# Patient Record
Sex: MALE | Race: WHITE | NOT HISPANIC OR LATINO | Employment: OTHER | URBAN - METROPOLITAN AREA
[De-identification: names, ages, dates, MRNs, and addresses within clinical notes are randomized per-mention and may not be internally consistent; named-entity substitution may affect disease eponyms.]

---

## 2018-02-27 ENCOUNTER — HOSPITAL ENCOUNTER (OUTPATIENT)
Facility: MEDICAL CENTER | Age: 63
End: 2018-02-27
Attending: UROLOGY
Payer: COMMERCIAL

## 2018-02-27 LAB
APPEARANCE UR: ABNORMAL
BILIRUB UR QL STRIP.AUTO: NEGATIVE
COLOR UR: YELLOW
GLUCOSE UR STRIP.AUTO-MCNC: NEGATIVE MG/DL
KETONES UR STRIP.AUTO-MCNC: NEGATIVE MG/DL
LEUKOCYTE ESTERASE UR QL STRIP.AUTO: ABNORMAL
MICRO URNS: ABNORMAL
NITRITE UR QL STRIP.AUTO: POSITIVE
PH UR STRIP.AUTO: 5 [PH]
PROT UR QL STRIP: NEGATIVE MG/DL
RBC UR QL AUTO: ABNORMAL
SP GR UR STRIP.AUTO: 1.02
UROBILINOGEN UR STRIP.AUTO-MCNC: 0.2 MG/DL

## 2018-02-27 PROCEDURE — 87086 URINE CULTURE/COLONY COUNT: CPT

## 2018-02-27 PROCEDURE — 81001 URINALYSIS AUTO W/SCOPE: CPT

## 2018-02-27 PROCEDURE — 87186 SC STD MICRODIL/AGAR DIL: CPT

## 2018-02-27 PROCEDURE — 87077 CULTURE AEROBIC IDENTIFY: CPT

## 2018-02-28 LAB
BACTERIA #/AREA URNS HPF: ABNORMAL /HPF
CAOX CRY #/AREA URNS HPF: ABNORMAL /HPF
EPI CELLS #/AREA URNS HPF: NEGATIVE /HPF
HYALINE CASTS #/AREA URNS LPF: ABNORMAL /LPF
RBC # URNS HPF: ABNORMAL /HPF
WBC #/AREA URNS HPF: ABNORMAL /HPF

## 2018-03-02 LAB
BACTERIA UR CULT: ABNORMAL
BACTERIA UR CULT: ABNORMAL
SIGNIFICANT IND 70042: ABNORMAL
SITE SITE: ABNORMAL
SOURCE SOURCE: ABNORMAL

## 2018-04-19 ENCOUNTER — HOSPITAL ENCOUNTER (OUTPATIENT)
Dept: LAB | Facility: MEDICAL CENTER | Age: 63
End: 2018-04-19
Attending: UROLOGY
Payer: COMMERCIAL

## 2018-04-19 LAB
APPEARANCE UR: ABNORMAL
BACTERIA #/AREA URNS HPF: ABNORMAL /HPF
BILIRUB UR QL STRIP.AUTO: NEGATIVE
COLOR UR: YELLOW
EPI CELLS #/AREA URNS HPF: NEGATIVE /HPF
GLUCOSE UR STRIP.AUTO-MCNC: NEGATIVE MG/DL
HYALINE CASTS #/AREA URNS LPF: ABNORMAL /LPF
KETONES UR STRIP.AUTO-MCNC: NEGATIVE MG/DL
LEUKOCYTE ESTERASE UR QL STRIP.AUTO: ABNORMAL
MICRO URNS: ABNORMAL
NITRITE UR QL STRIP.AUTO: POSITIVE
PH UR STRIP.AUTO: 7.5 [PH]
PROT UR QL STRIP: NEGATIVE MG/DL
RBC # URNS HPF: ABNORMAL /HPF
RBC UR QL AUTO: NEGATIVE
SP GR UR STRIP.AUTO: 1.01
UROBILINOGEN UR STRIP.AUTO-MCNC: 1 MG/DL
WBC #/AREA URNS HPF: ABNORMAL /HPF

## 2018-04-19 PROCEDURE — 87086 URINE CULTURE/COLONY COUNT: CPT

## 2018-04-19 PROCEDURE — 81001 URINALYSIS AUTO W/SCOPE: CPT

## 2018-04-19 PROCEDURE — 87186 SC STD MICRODIL/AGAR DIL: CPT

## 2018-04-19 PROCEDURE — 87077 CULTURE AEROBIC IDENTIFY: CPT

## 2018-05-08 ENCOUNTER — HOSPITAL ENCOUNTER (OUTPATIENT)
Dept: LAB | Facility: MEDICAL CENTER | Age: 63
End: 2018-05-08
Attending: UROLOGY
Payer: COMMERCIAL

## 2018-05-08 PROCEDURE — 82565 ASSAY OF CREATININE: CPT

## 2018-05-08 PROCEDURE — 84520 ASSAY OF UREA NITROGEN: CPT

## 2018-05-08 PROCEDURE — 36415 COLL VENOUS BLD VENIPUNCTURE: CPT

## 2018-05-09 ENCOUNTER — HOSPITAL ENCOUNTER (OUTPATIENT)
Dept: RADIOLOGY | Facility: MEDICAL CENTER | Age: 63
End: 2018-05-09
Attending: UROLOGY
Payer: COMMERCIAL

## 2018-05-09 DIAGNOSIS — N20.9: ICD-10-CM

## 2018-05-09 DIAGNOSIS — E79.89: ICD-10-CM

## 2018-05-09 LAB
BUN SERPL-MCNC: 15 MG/DL (ref 8–22)
CREAT SERPL-MCNC: 0.97 MG/DL (ref 0.5–1.4)

## 2018-05-09 PROCEDURE — 74178 CT ABD&PLV WO CNTR FLWD CNTR: CPT

## 2018-05-09 PROCEDURE — 700117 HCHG RX CONTRAST REV CODE 255: Performed by: UROLOGY

## 2018-05-09 RX ADMIN — IOHEXOL 100 ML: 350 INJECTION, SOLUTION INTRAVENOUS at 15:25

## 2018-05-10 ENCOUNTER — HOSPITAL ENCOUNTER (OUTPATIENT)
Dept: LAB | Facility: MEDICAL CENTER | Age: 63
End: 2018-05-10
Attending: UROLOGY
Payer: COMMERCIAL

## 2018-05-10 PROCEDURE — 81001 URINALYSIS AUTO W/SCOPE: CPT

## 2018-05-10 PROCEDURE — 87086 URINE CULTURE/COLONY COUNT: CPT

## 2018-05-10 PROCEDURE — 87077 CULTURE AEROBIC IDENTIFY: CPT

## 2018-05-10 PROCEDURE — 87186 SC STD MICRODIL/AGAR DIL: CPT

## 2018-05-11 LAB
APPEARANCE UR: ABNORMAL
BACTERIA #/AREA URNS HPF: NEGATIVE /HPF
BILIRUB UR QL STRIP.AUTO: NEGATIVE
CAOX CRY #/AREA URNS HPF: ABNORMAL /HPF
COLOR UR: YELLOW
EPI CELLS #/AREA URNS HPF: NEGATIVE /HPF
GLUCOSE UR STRIP.AUTO-MCNC: NEGATIVE MG/DL
HYALINE CASTS #/AREA URNS LPF: ABNORMAL /LPF
KETONES UR STRIP.AUTO-MCNC: NEGATIVE MG/DL
LEUKOCYTE ESTERASE UR QL STRIP.AUTO: ABNORMAL
MICRO URNS: ABNORMAL
NITRITE UR QL STRIP.AUTO: POSITIVE
PH UR STRIP.AUTO: 6 [PH]
PROT UR QL STRIP: NEGATIVE MG/DL
RBC # URNS HPF: ABNORMAL /HPF
RBC UR QL AUTO: ABNORMAL
SP GR UR STRIP.AUTO: 1.03
UROBILINOGEN UR STRIP.AUTO-MCNC: 0.2 MG/DL
WBC #/AREA URNS HPF: ABNORMAL /HPF

## 2018-05-25 DIAGNOSIS — Z01.812 PRE-PROCEDURAL LABORATORY EXAMINATION: ICD-10-CM

## 2018-05-25 DIAGNOSIS — Z01.810 PRE-OPERATIVE CARDIOVASCULAR EXAMINATION: ICD-10-CM

## 2018-05-25 LAB
ANION GAP SERPL CALC-SCNC: 8 MMOL/L (ref 0–11.9)
APPEARANCE UR: ABNORMAL
BACTERIA #/AREA URNS HPF: ABNORMAL /HPF
BILIRUB UR QL STRIP.AUTO: NEGATIVE
BUN SERPL-MCNC: 16 MG/DL (ref 8–22)
CALCIUM SERPL-MCNC: 9.7 MG/DL (ref 8.5–10.5)
CHLORIDE SERPL-SCNC: 104 MMOL/L (ref 96–112)
CO2 SERPL-SCNC: 26 MMOL/L (ref 20–33)
COLOR UR: YELLOW
CREAT SERPL-MCNC: 0.95 MG/DL (ref 0.5–1.4)
EKG IMPRESSION: NORMAL
EPI CELLS #/AREA URNS HPF: NEGATIVE /HPF
ERYTHROCYTE [DISTWIDTH] IN BLOOD BY AUTOMATED COUNT: 45.8 FL (ref 35.9–50)
GLUCOSE SERPL-MCNC: 103 MG/DL (ref 65–99)
GLUCOSE UR STRIP.AUTO-MCNC: NEGATIVE MG/DL
HCT VFR BLD AUTO: 48.4 % (ref 42–52)
HGB BLD-MCNC: 16.3 G/DL (ref 14–18)
HYALINE CASTS #/AREA URNS LPF: ABNORMAL /LPF
KETONES UR STRIP.AUTO-MCNC: NEGATIVE MG/DL
LEUKOCYTE ESTERASE UR QL STRIP.AUTO: ABNORMAL
MCH RBC QN AUTO: 29 PG (ref 27–33)
MCHC RBC AUTO-ENTMCNC: 33.7 G/DL (ref 33.7–35.3)
MCV RBC AUTO: 86.1 FL (ref 81.4–97.8)
MICRO URNS: ABNORMAL
NITRITE UR QL STRIP.AUTO: POSITIVE
PH UR STRIP.AUTO: 6.5 [PH]
PLATELET # BLD AUTO: 210 K/UL (ref 164–446)
PMV BLD AUTO: 9.7 FL (ref 9–12.9)
POTASSIUM SERPL-SCNC: 4 MMOL/L (ref 3.6–5.5)
PROT UR QL STRIP: NEGATIVE MG/DL
RBC # BLD AUTO: 5.62 M/UL (ref 4.7–6.1)
RBC # URNS HPF: ABNORMAL /HPF
RBC UR QL AUTO: ABNORMAL
SODIUM SERPL-SCNC: 138 MMOL/L (ref 135–145)
SP GR UR STRIP.AUTO: 1.02
UROBILINOGEN UR STRIP.AUTO-MCNC: 1 MG/DL
WBC # BLD AUTO: 7 K/UL (ref 4.8–10.8)
WBC #/AREA URNS HPF: ABNORMAL /HPF

## 2018-05-25 PROCEDURE — 87077 CULTURE AEROBIC IDENTIFY: CPT

## 2018-05-25 PROCEDURE — 87086 URINE CULTURE/COLONY COUNT: CPT

## 2018-05-25 PROCEDURE — 36415 COLL VENOUS BLD VENIPUNCTURE: CPT

## 2018-05-25 PROCEDURE — 80048 BASIC METABOLIC PNL TOTAL CA: CPT

## 2018-05-25 PROCEDURE — 93010 ELECTROCARDIOGRAM REPORT: CPT | Performed by: INTERNAL MEDICINE

## 2018-05-25 PROCEDURE — 81001 URINALYSIS AUTO W/SCOPE: CPT

## 2018-05-25 PROCEDURE — 93005 ELECTROCARDIOGRAM TRACING: CPT

## 2018-05-25 PROCEDURE — 87186 SC STD MICRODIL/AGAR DIL: CPT

## 2018-05-25 PROCEDURE — 85027 COMPLETE CBC AUTOMATED: CPT

## 2018-05-29 ENCOUNTER — APPOINTMENT (OUTPATIENT)
Dept: RADIOLOGY | Facility: MEDICAL CENTER | Age: 63
End: 2018-05-29
Attending: UROLOGY
Payer: COMMERCIAL

## 2018-05-29 ENCOUNTER — HOSPITAL ENCOUNTER (OUTPATIENT)
Facility: MEDICAL CENTER | Age: 63
End: 2018-05-29
Attending: UROLOGY | Admitting: UROLOGY
Payer: COMMERCIAL

## 2018-05-29 VITALS
RESPIRATION RATE: 16 BRPM | HEIGHT: 71 IN | HEART RATE: 110 BPM | BODY MASS INDEX: 25.4 KG/M2 | SYSTOLIC BLOOD PRESSURE: 128 MMHG | TEMPERATURE: 98.6 F | DIASTOLIC BLOOD PRESSURE: 96 MMHG | OXYGEN SATURATION: 94 % | WEIGHT: 181.44 LBS

## 2018-05-29 PROCEDURE — 700111 HCHG RX REV CODE 636 W/ 250 OVERRIDE (IP)

## 2018-05-29 RX ORDER — SODIUM CHLORIDE, SODIUM LACTATE, POTASSIUM CHLORIDE, CALCIUM CHLORIDE 600; 310; 30; 20 MG/100ML; MG/100ML; MG/100ML; MG/100ML
INJECTION, SOLUTION INTRAVENOUS CONTINUOUS
Status: DISCONTINUED | OUTPATIENT
Start: 2018-05-29 | End: 2018-05-29 | Stop reason: HOSPADM

## 2018-05-29 RX ORDER — LIDOCAINE HYDROCHLORIDE 10 MG/ML
0.5 INJECTION, SOLUTION INFILTRATION; PERINEURAL
Status: DISCONTINUED | OUTPATIENT
Start: 2018-05-29 | End: 2018-05-29 | Stop reason: HOSPADM

## 2018-05-29 RX ORDER — LIDOCAINE HYDROCHLORIDE 10 MG/ML
INJECTION, SOLUTION EPIDURAL; INFILTRATION; INTRACAUDAL; PERINEURAL
Status: COMPLETED
Start: 2018-05-29 | End: 2018-05-29

## 2018-05-29 RX ORDER — NITROFURANTOIN 25; 75 MG/1; MG/1
100 CAPSULE ORAL 2 TIMES DAILY
Status: ON HOLD | COMMUNITY
Start: 2018-05-03 | End: 2018-06-01

## 2018-05-29 RX ADMIN — SODIUM CHLORIDE, SODIUM LACTATE, POTASSIUM CHLORIDE, CALCIUM CHLORIDE: 600; 310; 30; 20 INJECTION, SOLUTION INTRAVENOUS at 10:45

## 2018-05-29 RX ADMIN — LIDOCAINE HYDROCHLORIDE: 10 INJECTION, SOLUTION EPIDURAL; INFILTRATION; INTRACAUDAL; PERINEURAL at 10:30

## 2018-05-29 NOTE — PROGRESS NOTES
Phone call placed to Dr. Demarco regarding pt's positive UA culture on 5/25/18, according to pt no antibiotics were given at that time. Awaiting call back from Dr. Demarco.

## 2018-05-29 NOTE — PROGRESS NOTES
The Medication Reconciliation process has been completed by interviewing the patient, also called his pharmacy for Abx info.    Allergies have been reviewed  Antibiotic use in 30 days - nitrofurantoin    Home Pharmacy:  [facility]

## 2018-05-29 NOTE — PROGRESS NOTES
Pt had positive UA on 5/25/18 and was instructed to go to the ER as he had fever and chills. Pt did not go and has not been treated for his UTI. Case cancelled by Dr. Demarco who will prescribe antibiotics and reschedule.

## 2018-06-01 ENCOUNTER — APPOINTMENT (OUTPATIENT)
Dept: RADIOLOGY | Facility: MEDICAL CENTER | Age: 63
End: 2018-06-01
Attending: UROLOGY
Payer: COMMERCIAL

## 2018-06-01 ENCOUNTER — HOSPITAL ENCOUNTER (OUTPATIENT)
Facility: MEDICAL CENTER | Age: 63
End: 2018-06-01
Attending: UROLOGY | Admitting: UROLOGY
Payer: COMMERCIAL

## 2018-06-01 VITALS
DIASTOLIC BLOOD PRESSURE: 96 MMHG | HEIGHT: 71 IN | WEIGHT: 181 LBS | OXYGEN SATURATION: 92 % | TEMPERATURE: 98.1 F | RESPIRATION RATE: 13 BRPM | SYSTOLIC BLOOD PRESSURE: 136 MMHG | BODY MASS INDEX: 25.34 KG/M2 | HEART RATE: 82 BPM

## 2018-06-01 PROCEDURE — 700111 HCHG RX REV CODE 636 W/ 250 OVERRIDE (IP)

## 2018-06-01 PROCEDURE — 700101 HCHG RX REV CODE 250: Performed by: UROLOGY

## 2018-06-01 PROCEDURE — 160025 RECOVERY II MINUTES (STATS): Performed by: UROLOGY

## 2018-06-01 PROCEDURE — 88300 SURGICAL PATH GROSS: CPT

## 2018-06-01 PROCEDURE — A9270 NON-COVERED ITEM OR SERVICE: HCPCS

## 2018-06-01 PROCEDURE — 88305 TISSUE EXAM BY PATHOLOGIST: CPT | Mod: 59

## 2018-06-01 PROCEDURE — 160048 HCHG OR STATISTICAL LEVEL 1-5: Performed by: UROLOGY

## 2018-06-01 PROCEDURE — 160009 HCHG ANES TIME/MIN: Performed by: UROLOGY

## 2018-06-01 PROCEDURE — 160035 HCHG PACU - 1ST 60 MINS PHASE I: Performed by: UROLOGY

## 2018-06-01 PROCEDURE — 700111 HCHG RX REV CODE 636 W/ 250 OVERRIDE (IP): Performed by: UROLOGY

## 2018-06-01 PROCEDURE — 160041 HCHG SURGERY MINUTES - EA ADDL 1 MIN LEVEL 4: Performed by: UROLOGY

## 2018-06-01 PROCEDURE — 160029 HCHG SURGERY MINUTES - 1ST 30 MINS LEVEL 4: Performed by: UROLOGY

## 2018-06-01 PROCEDURE — 700117 HCHG RX CONTRAST REV CODE 255

## 2018-06-01 PROCEDURE — 501329 HCHG SET, CYSTO IRRIG Y TUR: Performed by: UROLOGY

## 2018-06-01 PROCEDURE — 700102 HCHG RX REV CODE 250 W/ 637 OVERRIDE(OP)

## 2018-06-01 PROCEDURE — C1758 CATHETER, URETERAL: HCPCS | Performed by: UROLOGY

## 2018-06-01 PROCEDURE — A4346 CATH INDW FOLEY 3 WAY: HCPCS | Performed by: UROLOGY

## 2018-06-01 PROCEDURE — 160046 HCHG PACU - 1ST 60 MINS PHASE II: Performed by: UROLOGY

## 2018-06-01 PROCEDURE — 160002 HCHG RECOVERY MINUTES (STAT): Performed by: UROLOGY

## 2018-06-01 PROCEDURE — 700105 HCHG RX REV CODE 258: Performed by: UROLOGY

## 2018-06-01 PROCEDURE — 502240 HCHG MISC OR SUPPLY RC 0272: Performed by: UROLOGY

## 2018-06-01 PROCEDURE — 160047 HCHG PACU  - EA ADDL 30 MINS PHASE II: Performed by: UROLOGY

## 2018-06-01 PROCEDURE — 500880 HCHG PACK, CYSTO W/SEP LEGGINGS: Performed by: UROLOGY

## 2018-06-01 PROCEDURE — 160036 HCHG PACU - EA ADDL 30 MINS PHASE I: Performed by: UROLOGY

## 2018-06-01 RX ORDER — OXYCODONE HYDROCHLORIDE AND ACETAMINOPHEN 5; 325 MG/1; MG/1
TABLET ORAL
Status: COMPLETED
Start: 2018-06-01 | End: 2018-06-01

## 2018-06-01 RX ORDER — SODIUM CHLORIDE, SODIUM LACTATE, POTASSIUM CHLORIDE, CALCIUM CHLORIDE 600; 310; 30; 20 MG/100ML; MG/100ML; MG/100ML; MG/100ML
INJECTION, SOLUTION INTRAVENOUS CONTINUOUS
Status: DISCONTINUED | OUTPATIENT
Start: 2018-06-01 | End: 2018-06-01 | Stop reason: HOSPADM

## 2018-06-01 RX ORDER — KETOROLAC TROMETHAMINE 30 MG/ML
INJECTION, SOLUTION INTRAMUSCULAR; INTRAVENOUS
Status: COMPLETED
Start: 2018-06-01 | End: 2018-06-01

## 2018-06-01 RX ORDER — CIPROFLOXACIN 2 MG/ML
400 INJECTION, SOLUTION INTRAVENOUS ONCE
Status: DISCONTINUED | OUTPATIENT
Start: 2018-06-01 | End: 2018-06-01 | Stop reason: HOSPADM

## 2018-06-01 RX ORDER — LIDOCAINE HYDROCHLORIDE 10 MG/ML
0.5 INJECTION, SOLUTION INFILTRATION; PERINEURAL
Status: DISCONTINUED | OUTPATIENT
Start: 2018-06-01 | End: 2018-06-01 | Stop reason: HOSPADM

## 2018-06-01 RX ORDER — DOXYCYCLINE HYCLATE 100 MG
100 TABLET ORAL 2 TIMES DAILY
Status: ON HOLD | COMMUNITY
Start: 2018-05-29 | End: 2019-11-13

## 2018-06-01 RX ADMIN — KETOROLAC TROMETHAMINE 30 MG: 30 INJECTION, SOLUTION INTRAMUSCULAR at 16:07

## 2018-06-01 RX ADMIN — SODIUM CHLORIDE, SODIUM LACTATE, POTASSIUM CHLORIDE, CALCIUM CHLORIDE: 600; 310; 30; 20 INJECTION, SOLUTION INTRAVENOUS at 11:45

## 2018-06-01 RX ADMIN — HYDROMORPHONE HYDROCHLORIDE 0.5 MG: 10 INJECTION, SOLUTION INTRAMUSCULAR; INTRAVENOUS; SUBCUTANEOUS at 15:03

## 2018-06-01 RX ADMIN — OXYCODONE AND ACETAMINOPHEN 2 TABLET: 5; 325 TABLET ORAL at 15:15

## 2018-06-01 ASSESSMENT — PAIN SCALES - GENERAL
PAINLEVEL_OUTOF10: 0
PAINLEVEL_OUTOF10: 9
PAINLEVEL_OUTOF10: 8
PAINLEVEL_OUTOF10: 3
PAINLEVEL_OUTOF10: 0
PAINLEVEL_OUTOF10: 0

## 2018-06-01 NOTE — OP REPORT
DATE OF SERVICE:  06/01/2018    INDICATION FOR PROCEDURE:  Patient has been passing multiple bladder stones on   cystoscopy for hematuria workup, we found multiple bladder stones.  Thus, the   patient has been consented for cystolitholapaxy, possible bladder biopsy,   possible retrograde, the CT, however.  Urogram did not show any filling   defects in the ureters, so retrograde at this point is not necessary.  The   patient understands the risks and benefits of possible bladder biopsy and   cystolitholapaxy to include bleeding, sepsis, death, infection, need for   catheter, need for urethral dilation, need for prolonged catheter drainage in   the case of bladder perforation, stricture formation, and damage to pelvic and   surrounding structures and chronic pain.  He agrees to proceed with the   procedure.    PREOPERATIVE DIAGNOSIS:  Bladder stones.    POSTOPERATIVE DIAGNOSIS:  Bladder stones and 2 calcified ulcers in the   posterior wall of the bladder status post biopsy and fulguration.    PROCEDURE PERFORMED:  Cystoscopy, urethral dilation with a DVIU of this ureter   with the Israel urethrotome, cystolitholapaxy, bladder biopsy of posterior   lesions and fulguration of lesions.  The path was sent for permanent.    SURGEON:  Jeovany Demarco MD    ANESTHESIOLOGIST:  Mitchel Wells MD    SPECIMEN:  Posterior bladder wall and posterior midline bladder wall biopsy.    ESTIMATED BLOOD LOSS:  Less than 2 mL.    FINDINGS:  Two 1.5x1.5 cm calcified lesions in the posterior bladder wall that   were resected and fulgurated.  They were calcified and all the stones were   removed from its bed and bladder stones.    COMPLICATIONS:  None.    DRAIN:  Faulkner:  A 20-Andorran silicone, which we will keep for 5 days and get a   voiding trial on the next Wednesday in the office.    DESCRIPTION OF PROCEDURE:  The procedure was carried out in the following   fashion.  After the patient was properly identified, the labs were reviewed,    the consent was verified, the H and P was updated.  Plan was discussed with   the patient and the operative team.  The patient expressed verbal   understanding of the procedure and desired to proceed and had no further   questions.  He had been on antibiotics and is feeling much better for the past   24 hours given the positive urine culture.  We felt that 48 hours of   antibiotics were needed to sterilize his urine, but most likely there was the   nidus for infection in the bladder.  Thus, vancomycin and Cipro were given in   the OR as well to help prevent infectious sequelae.  The patient was then   taken to the operative theater, placed in supine position where general   anesthesia was introduced.  He was then moved to dorsal lithotomy position,   care being taken to pad all pressure points and avoid any perturbations of   joints that may cause injury and this was maintained throughout the procedure.    Surgical time-out was performed.  The proper patient, site, and procedure   were identified, all in the room agreed to proceed.  There were no issues.    Antibiotics as mentioned were given.  We began the procedure with a 21-Egyptian   sheath per urethra in the bladder.  Bladder was inspected.  The lesions were   found and identified as normal.  We flushed out smaller stones and then   introduced the 25-Egyptian sheath.  This would not go.  This was small and that   will accommodate the lithotrite.  Thus, we dilated the urethra to 28 Egyptian,   but could not again get the 25 through the meatus as we took the Israel   urethrotome and did a DVIU with an obviously urethra with Israel urethrotome to   28-Egyptian.  We then were able to introduce the 25-Egyptian and break the stones   into small pieces.  We then evacuated the stones and then went in and again   identified these ulcerative calcified lesions in posterior.  We then took the   resectoscope 26-Egyptian, and we were able to introduce that and resected these   lesions  scraping off the stones as the bipolar went on to cut through the   stones and removing all of the stones.  We then took deep biopsies of muscle   of these 2 areas and fulgurated them completely to rule out neoplasia.  The   specimens were evacuated, the stones were evacuated.  The stones and the   specimens were sent to the lab for analysis.  A 20-Mozambican Faulkner was placed, 10   mL in a balloon, this will remain for 5 days to help the urethra heal and to   avoid discomfort from the urethrotomy.       ____________________________________     MD ANDREE Melendez / RADHA    DD:  06/01/2018 14:31:37  DT:  06/01/2018 15:26:06    D#:  [id number]  Job#:  [id number]

## 2018-06-01 NOTE — DISCHARGE INSTRUCTIONS
ACTIVITY: Rest and take it easy for the first 24 hours.  A responsible adult is recommended to remain with you during that time.  It is normal to feel sleepy.  We encourage you to not do anything that requires balance, judgment or coordination.    MILD FLU-LIKE SYMPTOMS ARE NORMAL. YOU MAY EXPERIENCE GENERALIZED MUSCLE ACHES, THROAT IRRITATION, HEADACHE AND/OR SOME NAUSEA.    FOR 24 HOURS DO NOT:  Drive, operate machinery or run household appliances.  Drink beer or alcoholic beverages.   Make important decisions or sign legal documents.    SPECIAL INSTRUCTIONS:   Voiding trial in 5 days in the office.   Call any questions.   Call us for Nausea, vomiting and diarrhea. Fever, Chills or Shivering. Shortness of Breath. Chest Pain, Lower Extremity Edema or catheter issues.   Resume prior diet.   Office will call to arrange follow-up appointment.   No submerging in water with catheter in. Shower only.        Faulkner Catheter Care, Adult  A Faulkner catheter is a soft, flexible tube that is placed into the bladder to drain urine. A Faulkner catheter may be inserted if:  · You leak urine or are not able to control when you urinate (urinary incontinence).  · You are not able to urinate when you need to (urinary retention).  · You had prostate surgery or surgery on the genitals.  · You have certain medical conditions, such as multiple sclerosis, dementia, or a spinal cord injury.  If you are going home with a Faulkner catheter in place, follow the instructions below.  TAKING CARE OF THE CATHETER  1. Wash your hands with soap and water.  2. Using mild soap and warm water on a clean washcloth:  ¨ Clean the area on your body closest to the catheter insertion site using a circular motion, moving away from the catheter. Never wipe toward the catheter because this could sweep bacteria up into the urethra and cause infection.  ¨ Remove all traces of soap. Pat the area dry with a clean towel. For males, reposition the foreskin.  3. Attach the  catheter to your leg so there is no tension on the catheter. Use adhesive tape or a leg strap. If you are using adhesive tape, remove any sticky residue left behind by the previous tape you used.  4. Keep the drainage bag below the level of the bladder, but keep it off the floor.  5. Check throughout the day to be sure the catheter is working and urine is draining freely. Make sure the tubing does not become kinked.  6. Do not pull on the catheter or try to remove it. Pulling could damage internal tissues.  TAKING CARE OF THE DRAINAGE BAGS  You will be given two drainage bags to take home. One is a large overnight drainage bag, and the other is a smaller leg bag that fits underneath clothing. You may wear the overnight bag at any time, but you should never wear the smaller leg bag at night. Follow the instructions below for how to empty, change, and clean your drainage bags.  Emptying the Drainage Bag  You must empty your drainage bag when it is  -½ full or at least 2-3 times a day.  1. Wash your hands with soap and water.  2. Keep the drainage bag below your hips, below the level of your bladder. This stops urine from going back into the tubing and into your bladder.  3. Hold the dirty bag over the toilet or a clean container.  4. Open the pour spout at the bottom of the bag and empty the urine into the toilet or container. Do not let the pour spout touch the toilet, container, or any other surface. Doing so can place bacteria on the bag, which can cause an infection.  5. Clean the pour spout with a gauze pad or cotton ball that has rubbing alcohol on it.  6. Close the pour spout.  7. Attach the bag to your leg with adhesive tape or a leg strap.  8. Wash your hands well.  Changing the Drainage Bag  Change your drainage bag once a month or sooner if it starts to smell bad or look dirty. Below are steps to follow when changing the drainage bag.  1. Wash your hands with soap and water.  2. Pinch off the rubber  catheter so that urine does not spill out.  3. Disconnect the catheter tube from the drainage tube at the connection valve. Do not let the tubes touch any surface.  4. Clean the end of the catheter tube with an alcohol wipe. Use a different alcohol wipe to clean the end of the drainage tube.  5. Connect the catheter tube to the drainage tube of the clean drainage bag.  6. Attach the new bag to the leg with adhesive tape or a leg strap. Avoid attaching the new bag too tightly.  7. Wash your hands well.  Cleaning the Drainage Bag  1. Wash your hands with soap and water.  2. Wash the bag in warm, soapy water.  3. Rinse the bag thoroughly with warm water.  4. Fill the bag with a solution of white vinegar and water (1 cup vinegar to 1 qt warm water [.2 L vinegar to 1 L warm water]). Close the bag and soak it for 30 minutes in the solution.  5. Rinse the bag with warm water.  6. Hang the bag to dry with the pour spout open and hanging downward.  7. Store the clean bag (once it is dry) in a clean plastic bag.  8. Wash your hands well.  PREVENTING INFECTION  · Wash your hands before and after handling your catheter.  · Take showers daily and wash the area where the catheter enters your body. Do not take baths. Replace wet leg straps with dry ones, if this applies.  · Do not use powders, sprays, or lotions on the genital area. Only use creams, lotions, or ointments as directed by your caregiver.  · For females, wipe from front to back after each bowel movement.  · Drink enough fluids to keep your urine clear or pale yellow unless you have a fluid restriction.  · Do not let the drainage bag or tubing touch or lie on the floor.  · Wear cotton underwear to absorb moisture and to keep your .  SEEK MEDICAL CARE IF:   · Your urine is cloudy or smells unusually bad.  · Your catheter becomes clogged.  · You are not draining urine into the bag or your bladder feels full.  · Your catheter starts to leak.  SEEK IMMEDIATE  MEDICAL CARE IF:   · You have pain, swelling, redness, or pus where the catheter enters the body.  · You have pain in the abdomen, legs, lower back, or bladder.  · You have a fever.  · You see blood fill the catheter, or your urine is pink or red.  · You have nausea, vomiting, or chills.  · Your catheter gets pulled out.  MAKE SURE YOU:   · Understand these instructions.  · Will watch your condition.  · Will get help right away if you are not doing well or get worse.     This information is not intended to replace advice given to you by your health care provider. Make sure you discuss any questions you have with your health care provider.         DIET: To avoid nausea, slowly advance diet as tolerated, avoiding spicy or greasy foods for the first day.  Add more substantial food to your diet according to your physician's instructions.  Babies can be fed formula or breast milk as soon as they are hungry.  INCREASE FLUIDS AND FIBER TO AVOID CONSTIPATION.    SURGICAL DRESSING/BATHING: Ok to shower.    FOLLOW-UP APPOINTMENT:  A follow-up appointment should be arranged with your doctor in 1-2 weeks; call to schedule.    You should CALL YOUR PHYSICIAN if you develop:  Fever greater than 101 degrees F.  Pain not relieved by medication, or persistent nausea or vomiting.  Excessive bleeding (blood soaking through dressing) or unexpected drainage from the wound.  Extreme redness or swelling around the incision site, drainage of pus or foul smelling drainage.  Inability to urinate or empty your bladder within 8 hours.  Problems with breathing or chest pain.    You should call 911 if you develop problems with breathing or chest pain.  If you are unable to contact your doctor or surgical center, you should go to the nearest emergency room or urgent care center.  Physician's telephone #: Dr. Demarco ([phone])    If any questions arise, call your doctor.  If your doctor is not available, please feel free to call the Surgical  Center at [phone].  The Center is open Monday through Friday from 7AM to 7PM.  You can also call the HEALTH HOTLINE open 24 hours/day, 7 days/week and speak to a nurse at [phone], or toll free at [phone].    A registered nurse may call you a few days after your surgery to see how you are doing after your procedure.    MEDICATIONS: Resume taking daily medication.  Take prescribed pain medication with food.  If no medication is prescribed, you may take non-aspirin pain medication if needed.  PAIN MEDICATION CAN BE VERY CONSTIPATING.  Take a stool softener or laxative such as senokot, pericolace, or milk of magnesia if needed.    Prescription given for ditropan, tramadol and pyridium.  Last pain medication given at 3:15pm.    If your physician has prescribed pain medication that includes Acetaminophen (Tylenol), do not take additional Acetaminophen (Tylenol) while taking the prescribed medication.    Depression / Suicide Risk    As you are discharged from this [facility] facility, it is important to learn how to keep safe from harming yourself.    Recognize the warning signs:  · Abrupt changes in personality, positive or negative- including increase in energy   · Giving away possessions  · Change in eating patterns- significant weight changes-  positive or negative  · Change in sleeping patterns- unable to sleep or sleeping all the time   · Unwillingness or inability to communicate  · Depression  · Unusual sadness, discouragement and loneliness  · Talk of wanting to die  · Neglect of personal appearance   · Rebelliousness- reckless behavior  · Withdrawal from people/activities they love  · Confusion- inability to concentrate     If you or a loved one observes any of these behaviors or has concerns about self-harm, here's what you can do:  · Talk about it- your feelings and reasons for harming yourself  · Remove any means that you might use to hurt yourself (examples: pills, rope, extension cords,  firearm)  · Get professional help from the community (Mental Health, Substance Abuse, psychological counseling)  · Do not be alone:Call your Safe Contact- someone whom you trust who will be there for you.  · Call your local CRISIS HOTLINE 236-4131 or 504-138-0758  · Call your local Children's Mobile Crisis Response Team [place] (418) 599-5501 or www.Incuvo  · Call the toll free National Suicide Prevention Hotlines   · National Suicide Prevention Lifeline 106-958-DWBB (8693)  · National Hope Line Network 800-SUICIDE (445-5038)

## 2018-06-01 NOTE — PROGRESS NOTES
Patient arrived in pacu with LMA in place. Nasal cannula O2 stuck in top of LMA at 4l/min. No distress

## 2018-06-01 NOTE — PROGRESS NOTES
Called number listed on chart ([phone]) to update friend. No answer and unable to leave a message due to mailbox being full. Patient meets pacu discharge criteria, easily awakens, AAO,4, pain well controlled, tolerating po.

## 2018-06-01 NOTE — OR SURGEON
Immediate Post OP Note    PreOp Diagnosis: bladder stones    PostOp Diagnosis: bladder stones, calcified ulcers x 2 in post wall s/p Bx and fulgaration    Procedure(s):  BLADDER BIOPSY WITH CYSTOSCOPY/ Fulgaration of lesions - CYSTOLITHOLAPAXY, DVIU distal urethra with Israel urethrotome - Wound Class: Clean Contaminated      Surgeon(s):  Jeovany Demarco M.D.    Anesthesiologist/Type of Anesthesia:  Anesthesiologist: Mitchel Wells M.D./General    Surgical Staff:  Circulator: Adria Simental R.N.  Laser Staff: Nacho Koenig  Scrub Person: Bryce Thomas    Specimens removed if any:  Posterior bladder wall bx and midline post wall biopsy    Estimated Blood Loss: 2 cc    Findings: posterior 1.5 cm x 1.5cm calcified lesion x 2  , Bladder stones    Complications: none    Faulkner 20 f silicone c 5 days VT in office in 5 days.    [id number]            6/1/2018 2:22 PM Jeovany Demarco M.D.

## 2018-06-01 NOTE — OR NURSING
Assumed care of patient at 1550.   Patient alert and oriented sleepy but is arousable. Patient denies numbness and tingling. Pain is currently 9/10. See MAR. See flowsheets for VS. Call light within reach. Madyson in lowest position.     Faulkner is intact clean and dry, urine is light yellow.    Patient tolerating ginger ale and crackers.

## 2018-06-01 NOTE — PROGRESS NOTES
The Medication Reconciliation process has been completed by interviewing the patient    Allergies have been reviewed  Antibiotic use in 30 days - Nitrofurantoin and doxycycline    Home Pharmacy:  [facility]

## 2018-06-02 NOTE — OR NURSING
Discharge instruction given. At home de paz information discussed. Paient verbally understands. Leg bag attached. Dressed and waiting for ride.     No needs or concerns at this time.

## 2019-11-13 ENCOUNTER — APPOINTMENT (OUTPATIENT)
Dept: RADIOLOGY | Facility: MEDICAL CENTER | Age: 64
DRG: 660 | End: 2019-11-13
Attending: UROLOGY
Payer: MEDICARE

## 2019-11-13 ENCOUNTER — HOSPITAL ENCOUNTER (OUTPATIENT)
Facility: MEDICAL CENTER | Age: 64
DRG: 660 | End: 2019-11-13
Attending: UROLOGY | Admitting: UROLOGY
Payer: MEDICARE

## 2019-11-13 ENCOUNTER — ANESTHESIA EVENT (OUTPATIENT)
Dept: SURGERY | Facility: MEDICAL CENTER | Age: 64
DRG: 660 | End: 2019-11-13
Payer: MEDICARE

## 2019-11-13 ENCOUNTER — ANESTHESIA (OUTPATIENT)
Dept: SURGERY | Facility: MEDICAL CENTER | Age: 64
DRG: 660 | End: 2019-11-13
Payer: MEDICARE

## 2019-11-13 VITALS
WEIGHT: 174.38 LBS | BODY MASS INDEX: 24.41 KG/M2 | DIASTOLIC BLOOD PRESSURE: 91 MMHG | SYSTOLIC BLOOD PRESSURE: 128 MMHG | HEIGHT: 71 IN | HEART RATE: 100 BPM | RESPIRATION RATE: 14 BRPM | OXYGEN SATURATION: 93 % | TEMPERATURE: 98.7 F

## 2019-11-13 LAB
EKG IMPRESSION: NORMAL
PATHOLOGY CONSULT NOTE: NORMAL

## 2019-11-13 PROCEDURE — A9270 NON-COVERED ITEM OR SERVICE: HCPCS | Performed by: ANESTHESIOLOGY

## 2019-11-13 PROCEDURE — 501329 HCHG SET, CYSTO IRRIG Y TUR: Performed by: UROLOGY

## 2019-11-13 PROCEDURE — 700105 HCHG RX REV CODE 258: Performed by: UROLOGY

## 2019-11-13 PROCEDURE — 93010 ELECTROCARDIOGRAM REPORT: CPT | Performed by: INTERNAL MEDICINE

## 2019-11-13 PROCEDURE — 88300 SURGICAL PATH GROSS: CPT

## 2019-11-13 PROCEDURE — 700111 HCHG RX REV CODE 636 W/ 250 OVERRIDE (IP): Performed by: ANESTHESIOLOGY

## 2019-11-13 PROCEDURE — 160048 HCHG OR STATISTICAL LEVEL 1-5: Performed by: UROLOGY

## 2019-11-13 PROCEDURE — 500879 HCHG PACK, CYSTO: Performed by: UROLOGY

## 2019-11-13 PROCEDURE — 160002 HCHG RECOVERY MINUTES (STAT): Performed by: UROLOGY

## 2019-11-13 PROCEDURE — 160009 HCHG ANES TIME/MIN: Performed by: UROLOGY

## 2019-11-13 PROCEDURE — 93005 ELECTROCARDIOGRAM TRACING: CPT | Performed by: UROLOGY

## 2019-11-13 PROCEDURE — C1769 GUIDE WIRE: HCPCS | Performed by: UROLOGY

## 2019-11-13 PROCEDURE — 160029 HCHG SURGERY MINUTES - 1ST 30 MINS LEVEL 4: Performed by: UROLOGY

## 2019-11-13 PROCEDURE — 700111 HCHG RX REV CODE 636 W/ 250 OVERRIDE (IP): Performed by: UROLOGY

## 2019-11-13 PROCEDURE — 160035 HCHG PACU - 1ST 60 MINS PHASE I: Performed by: UROLOGY

## 2019-11-13 PROCEDURE — 700111 HCHG RX REV CODE 636 W/ 250 OVERRIDE (IP)

## 2019-11-13 PROCEDURE — 82365 CALCULUS SPECTROSCOPY: CPT

## 2019-11-13 PROCEDURE — 700102 HCHG RX REV CODE 250 W/ 637 OVERRIDE(OP): Performed by: ANESTHESIOLOGY

## 2019-11-13 PROCEDURE — 700117 HCHG RX CONTRAST REV CODE 255: Performed by: UROLOGY

## 2019-11-13 PROCEDURE — 0TC08ZZ EXTIRPATION OF MATTER FROM RIGHT KIDNEY, VIA NATURAL OR ARTIFICIAL OPENING ENDOSCOPIC: ICD-10-PCS | Performed by: UROLOGY

## 2019-11-13 PROCEDURE — 160046 HCHG PACU - 1ST 60 MINS PHASE II: Performed by: UROLOGY

## 2019-11-13 PROCEDURE — 160041 HCHG SURGERY MINUTES - EA ADDL 1 MIN LEVEL 4: Performed by: UROLOGY

## 2019-11-13 PROCEDURE — 0TC68ZZ EXTIRPATION OF MATTER FROM RIGHT URETER, VIA NATURAL OR ARTIFICIAL OPENING ENDOSCOPIC: ICD-10-PCS | Performed by: UROLOGY

## 2019-11-13 PROCEDURE — 0TP98DZ REMOVAL OF INTRALUMINAL DEVICE FROM URETER, VIA NATURAL OR ARTIFICIAL OPENING ENDOSCOPIC: ICD-10-PCS | Performed by: UROLOGY

## 2019-11-13 PROCEDURE — C1758 CATHETER, URETERAL: HCPCS | Performed by: UROLOGY

## 2019-11-13 PROCEDURE — 160036 HCHG PACU - EA ADDL 30 MINS PHASE I: Performed by: UROLOGY

## 2019-11-13 PROCEDURE — 160025 RECOVERY II MINUTES (STATS): Performed by: UROLOGY

## 2019-11-13 RX ORDER — ONDANSETRON 2 MG/ML
INJECTION INTRAMUSCULAR; INTRAVENOUS PRN
Status: DISCONTINUED | OUTPATIENT
Start: 2019-11-13 | End: 2019-11-13 | Stop reason: SURG

## 2019-11-13 RX ORDER — CIPROFLOXACIN 500 MG/1
500 TABLET, FILM COATED ORAL 2 TIMES DAILY
Qty: 10 TAB | Refills: 0 | Status: ON HOLD | OUTPATIENT
Start: 2019-11-13 | End: 2019-11-18

## 2019-11-13 RX ORDER — ONDANSETRON 2 MG/ML
4 INJECTION INTRAMUSCULAR; INTRAVENOUS
Status: DISCONTINUED | OUTPATIENT
Start: 2019-11-13 | End: 2019-11-13 | Stop reason: HOSPADM

## 2019-11-13 RX ORDER — MEPERIDINE HYDROCHLORIDE 25 MG/ML
12.5 INJECTION INTRAMUSCULAR; INTRAVENOUS; SUBCUTANEOUS
Status: DISCONTINUED | OUTPATIENT
Start: 2019-11-13 | End: 2019-11-13 | Stop reason: HOSPADM

## 2019-11-13 RX ORDER — SODIUM CHLORIDE, SODIUM LACTATE, POTASSIUM CHLORIDE, CALCIUM CHLORIDE 600; 310; 30; 20 MG/100ML; MG/100ML; MG/100ML; MG/100ML
INJECTION, SOLUTION INTRAVENOUS CONTINUOUS
Status: DISCONTINUED | OUTPATIENT
Start: 2019-11-13 | End: 2019-11-13 | Stop reason: HOSPADM

## 2019-11-13 RX ORDER — PHENAZOPYRIDINE HYDROCHLORIDE 200 MG/1
200 TABLET, FILM COATED ORAL
Status: CANCELLED | OUTPATIENT
Start: 2019-11-13

## 2019-11-13 RX ORDER — HYDROMORPHONE HYDROCHLORIDE 1 MG/ML
0.4 INJECTION, SOLUTION INTRAMUSCULAR; INTRAVENOUS; SUBCUTANEOUS
Status: DISCONTINUED | OUTPATIENT
Start: 2019-11-13 | End: 2019-11-13 | Stop reason: HOSPADM

## 2019-11-13 RX ORDER — METOPROLOL TARTRATE 1 MG/ML
1 INJECTION, SOLUTION INTRAVENOUS
Status: DISCONTINUED | OUTPATIENT
Start: 2019-11-13 | End: 2019-11-13 | Stop reason: HOSPADM

## 2019-11-13 RX ORDER — HALOPERIDOL 5 MG/ML
1 INJECTION INTRAMUSCULAR
Status: DISCONTINUED | OUTPATIENT
Start: 2019-11-13 | End: 2019-11-13 | Stop reason: HOSPADM

## 2019-11-13 RX ORDER — CEFAZOLIN SODIUM 1 G/3ML
INJECTION, POWDER, FOR SOLUTION INTRAMUSCULAR; INTRAVENOUS PRN
Status: DISCONTINUED | OUTPATIENT
Start: 2019-11-13 | End: 2019-11-13 | Stop reason: SURG

## 2019-11-13 RX ORDER — LIDOCAINE HYDROCHLORIDE 10 MG/ML
INJECTION, SOLUTION EPIDURAL; INFILTRATION; INTRACAUDAL; PERINEURAL
Status: COMPLETED
Start: 2019-11-13 | End: 2019-11-13

## 2019-11-13 RX ORDER — HYDROMORPHONE HYDROCHLORIDE 1 MG/ML
0.2 INJECTION, SOLUTION INTRAMUSCULAR; INTRAVENOUS; SUBCUTANEOUS
Status: DISCONTINUED | OUTPATIENT
Start: 2019-11-13 | End: 2019-11-13 | Stop reason: HOSPADM

## 2019-11-13 RX ORDER — HYDRALAZINE HYDROCHLORIDE 20 MG/ML
5 INJECTION INTRAMUSCULAR; INTRAVENOUS
Status: DISCONTINUED | OUTPATIENT
Start: 2019-11-13 | End: 2019-11-13 | Stop reason: HOSPADM

## 2019-11-13 RX ORDER — DEXAMETHASONE SODIUM PHOSPHATE 4 MG/ML
INJECTION, SOLUTION INTRA-ARTICULAR; INTRALESIONAL; INTRAMUSCULAR; INTRAVENOUS; SOFT TISSUE PRN
Status: DISCONTINUED | OUTPATIENT
Start: 2019-11-13 | End: 2019-11-13 | Stop reason: SURG

## 2019-11-13 RX ORDER — OXYCODONE HCL 5 MG/5 ML
5 SOLUTION, ORAL ORAL
Status: COMPLETED | OUTPATIENT
Start: 2019-11-13 | End: 2019-11-13

## 2019-11-13 RX ORDER — MIDAZOLAM HYDROCHLORIDE 1 MG/ML
1 INJECTION INTRAMUSCULAR; INTRAVENOUS
Status: DISCONTINUED | OUTPATIENT
Start: 2019-11-13 | End: 2019-11-13 | Stop reason: HOSPADM

## 2019-11-13 RX ORDER — OXYCODONE HCL 5 MG/5 ML
10 SOLUTION, ORAL ORAL
Status: COMPLETED | OUTPATIENT
Start: 2019-11-13 | End: 2019-11-13

## 2019-11-13 RX ORDER — KETOROLAC TROMETHAMINE 30 MG/ML
30 INJECTION, SOLUTION INTRAMUSCULAR; INTRAVENOUS ONCE
Status: COMPLETED | OUTPATIENT
Start: 2019-11-13 | End: 2019-11-13

## 2019-11-13 RX ORDER — GABAPENTIN 300 MG/1
300 CAPSULE ORAL ONCE
Status: CANCELLED | OUTPATIENT
Start: 2019-11-13 | End: 2019-11-13

## 2019-11-13 RX ORDER — OXYCODONE HCL 10 MG/1
10 TABLET, FILM COATED, EXTENDED RELEASE ORAL ONCE
Status: CANCELLED | OUTPATIENT
Start: 2019-11-13 | End: 2019-11-13

## 2019-11-13 RX ORDER — HYDROMORPHONE HYDROCHLORIDE 1 MG/ML
0.1 INJECTION, SOLUTION INTRAMUSCULAR; INTRAVENOUS; SUBCUTANEOUS
Status: DISCONTINUED | OUTPATIENT
Start: 2019-11-13 | End: 2019-11-13 | Stop reason: HOSPADM

## 2019-11-13 RX ORDER — DIPHENHYDRAMINE HYDROCHLORIDE 50 MG/ML
12.5 INJECTION INTRAMUSCULAR; INTRAVENOUS
Status: DISCONTINUED | OUTPATIENT
Start: 2019-11-13 | End: 2019-11-13 | Stop reason: HOSPADM

## 2019-11-13 RX ADMIN — HYDROMORPHONE HYDROCHLORIDE 0.2 MG: 1 INJECTION, SOLUTION INTRAMUSCULAR; INTRAVENOUS; SUBCUTANEOUS at 18:20

## 2019-11-13 RX ADMIN — PROPOFOL 150 MG: 10 INJECTION, EMULSION INTRAVENOUS at 14:11

## 2019-11-13 RX ADMIN — CEFAZOLIN 2 G: 330 INJECTION, POWDER, FOR SOLUTION INTRAMUSCULAR; INTRAVENOUS at 14:11

## 2019-11-13 RX ADMIN — LIDOCAINE HYDROCHLORIDE 0.5 ML: 10 INJECTION, SOLUTION EPIDURAL; INFILTRATION; INTRACAUDAL; PERINEURAL at 13:30

## 2019-11-13 RX ADMIN — Medication 0.5 ML: at 13:30

## 2019-11-13 RX ADMIN — FENTANYL CITRATE 50 MCG: 50 INJECTION, SOLUTION INTRAMUSCULAR; INTRAVENOUS at 16:53

## 2019-11-13 RX ADMIN — ONDANSETRON 4 MG: 2 INJECTION INTRAMUSCULAR; INTRAVENOUS at 13:40

## 2019-11-13 RX ADMIN — SODIUM CHLORIDE, POTASSIUM CHLORIDE, SODIUM LACTATE AND CALCIUM CHLORIDE: 600; 310; 30; 20 INJECTION, SOLUTION INTRAVENOUS at 13:35

## 2019-11-13 RX ADMIN — FENTANYL CITRATE 50 MCG: 50 INJECTION, SOLUTION INTRAMUSCULAR; INTRAVENOUS at 16:59

## 2019-11-13 RX ADMIN — MEPERIDINE HYDROCHLORIDE 12.5 MG: 25 INJECTION INTRAMUSCULAR; INTRAVENOUS; SUBCUTANEOUS at 17:13

## 2019-11-13 RX ADMIN — FENTANYL CITRATE 50 MCG: 50 INJECTION, SOLUTION INTRAMUSCULAR; INTRAVENOUS at 16:27

## 2019-11-13 RX ADMIN — FENTANYL CITRATE 100 MCG: 50 INJECTION, SOLUTION INTRAMUSCULAR; INTRAVENOUS at 14:18

## 2019-11-13 RX ADMIN — FENTANYL CITRATE 50 MCG: 50 INJECTION, SOLUTION INTRAMUSCULAR; INTRAVENOUS at 16:23

## 2019-11-13 RX ADMIN — MIDAZOLAM 0.5 MG: 1 INJECTION INTRAMUSCULAR; INTRAVENOUS at 18:30

## 2019-11-13 RX ADMIN — MIDAZOLAM 0.5 MG: 1 INJECTION INTRAMUSCULAR; INTRAVENOUS at 17:40

## 2019-11-13 RX ADMIN — DEXAMETHASONE SODIUM PHOSPHATE 4 MG: 4 INJECTION, SOLUTION INTRA-ARTICULAR; INTRALESIONAL; INTRAMUSCULAR; INTRAVENOUS; SOFT TISSUE at 14:11

## 2019-11-13 RX ADMIN — HYDROMORPHONE HYDROCHLORIDE 0.2 MG: 1 INJECTION, SOLUTION INTRAMUSCULAR; INTRAVENOUS; SUBCUTANEOUS at 18:31

## 2019-11-13 RX ADMIN — OXYCODONE HYDROCHLORIDE 10 MG: 5 SOLUTION ORAL at 16:21

## 2019-11-13 RX ADMIN — KETOROLAC TROMETHAMINE 30 MG: 30 INJECTION, SOLUTION INTRAMUSCULAR; INTRAVENOUS at 18:49

## 2019-11-13 RX ADMIN — MIDAZOLAM 0.5 MG: 1 INJECTION INTRAMUSCULAR; INTRAVENOUS at 17:48

## 2019-11-13 ASSESSMENT — PAIN SCALES - GENERAL: PAIN_LEVEL: 2

## 2019-11-13 NOTE — ANESTHESIA TIME REPORT
Anesthesia Start and Stop Event Times     Date Time Event    11/13/2019 1347 Ready for Procedure     1410 Anesthesia Start     1543 Anesthesia Stop        Responsible Staff  11/13/19    Name Role Begin End    Deisy Quiros M.D. Anesth 1410 1543        Preop Diagnosis (Free Text):  Pre-op Diagnosis     URETERAL STONE        Preop Diagnosis (Codes):    Post op Diagnosis  Renal stones      Premium Reason  A. 3PM - 7AM    Comments:

## 2019-11-13 NOTE — ANESTHESIA PREPROCEDURE EVALUATION
Relevant Problems   No relevant active problems       Physical Exam    Airway   Mallampati: I  TM distance: >3 FB  Neck ROM: full       Cardiovascular   Rhythm: regular  Rate: normal     Dental - normal exam         Pulmonary - normal exam     Abdominal   Abdomen: soft  Bowel sounds: normal   Neurological - normal exam                 Anesthesia Plan    ASA 2       Plan - general       Airway plan will be LMA      Plan Factors:   Patient did not smoke on day of procedure.      Induction: intravenous    Postoperative Plan: Postoperative administration of opioids is intended.    Pertinent diagnostic labs and testing reviewed    Informed Consent:    Anesthetic plan and risks discussed with patient.    Use of blood products discussed with: patient whom consented to blood products.

## 2019-11-13 NOTE — ANESTHESIA POSTPROCEDURE EVALUATION
Patient: Travis Oliva    Procedure Summary     Date:  11/13/19 Room / Location:  [facility] OR  / SURGERY [facility]    Anesthesia Start:  1410 Anesthesia Stop:  1543    Procedures:       URETEROSCOPY (N/A Penis)      LITHOTRIPSY, USING LASER (Right Penis)      CYSTOSCOPY (Bladder) Diagnosis:  (URETERAL STONE)    Surgeon:  Johnny Dobbins M.D. Responsible Provider:  Deisy Quiros M.D.    Anesthesia Type:  general ASA Status:  2          Final Anesthesia Type: general  Last vitals  BP   Blood Pressure : (!) 166/122(right arm), NIBP: 155/102(left arm)    Temp   36.8 °C (98.2 °F)    Pulse   Pulse: (!) 108   Resp   18    SpO2   96 %      Anesthesia Post Evaluation    Patient location during evaluation: bedside  Patient participation: complete - patient participated  Level of consciousness: lethargic  Pain score: 2    Airway patency: patent  Anesthetic complications: no  Cardiovascular status: adequate  Respiratory status: acceptable  Hydration status: acceptable    PONV: none

## 2019-11-13 NOTE — ANESTHESIA PROCEDURE NOTES
Airway  Date/Time: 11/13/2019 2:12 PM  Performed by: Deisy Quiros M.D.  Authorized by: Deisy Quiros M.D.     Location:  OR  Urgency:  Elective  Difficult Airway: No    Indications for Airway Management:  Anesthesia  Spontaneous Ventilation: absent    Sedation Level:  Deep  Preoxygenated: Yes    Patient Position:  Sniffing  MILS Maintained Throughout: Yes    Mask Difficulty Assessment:  1 - vent by mask  Final Airway Type:  Supraglottic airway  Final Supraglottic Airway:  Standard LMA  SGA Size:  4  Number of Attempts at Approach:  1  Ventilation Between Attempts:  BVM  Number of Other Approaches Attempted:  0

## 2019-11-14 NOTE — OP REPORT
DATE OF SERVICE:  11/13/2019    PREOPERATIVE DIAGNOSIS:  Right ureteral and renal calculi.    POSTOPERATIVE DIAGNOSIS:  Right ureteral and renal calculi.    PROCEDURE PERFORMED:  Right ureterorenoscopy, laser lithotripsy of ureteral   and renal stones, basket stone extraction of stone fragments, and removal of   right ureteral stent.    SURGEON:  Johnny Dobbins MD    ASSISTANT:  None.    ANESTHESIOLOGIST:  Deisy Quiros MD    INDICATIONS:  This 63-year-old male presented with an obstructing infected   right ureteral calculus.  Stent was placed and he was treated for infection   and returns for treatment of the stone and a right renal stone as well.    FINDINGS:  There was markedly encrusted stent after only 3 weeks of indwelling   time.  Distal encrustations were broken off with a grasping forceps.  As the   stent was removed, there was some blood and clot within the ureter.  After   removal of the stent, there were stone fragments, which were extracted and 2   stones in the right kidney, which were treated.  These were completely pale   white chalky stones.  Small fragments were irrigated from the collecting   system and stone dust was left in place.  No stent was left.    DESCRIPTION OF PROCEDURE:  The  patient was identified in the holding area.    He was taken to the operative suite.  General anesthesia was administered by   Dr. Quiros.  He was then positioned in the dorsal lithotomy position,   sterilely prepped and draped.  Cefazolin was given intravenously.  Time-out   was called.  Correct patient and site of surgery was confirmed.    A 22-Divehi cystourethroscope with 30-degree lens was introduced.  No urethral   stricturing was noted.  Trilobar prostatic hypertrophy with an elevated   median lobe was identified.  The lumen of the bladder was significant for   turbid urine, which was evacuated.  Copious crystalline type encrustations of   the distal coil of the stent were noted.  These were broken off  using a   grasping forceps and the stent withdrawn to the urethral meatus.  A sensor   guidewire could not be advanced through this, so cystoscope was advanced   alongside the stent and a sensor wire advanced alongside the stent into the   right renal collecting system and the stent removed.  A rigid ureteroscope was   then advanced alongside the guidewire.  Clot was evacuated and small stone   fragments were removed.  These appeared to be fragments broken off from the   stent.  No primary ureteral calculus was identified.    An applied medical long ureteral access sheath was then advanced over the   guidewire into the proximal ureter to the level where the rigid ureteroscope   had been advanced previously.  Through this, a flexible ureteroscopy with a   [employer] disposable flexible ureteroscope was performed.  Two stones   were identified in the collecting system of the kidney.  These were fragmented   using a 200 micron holmium laser fiber and various settings between 0.8   joules and 8 Hz and 0.2 joules and 40 Hz.  At the end of the fragmentation, a   very small residual fragments were noted.  Copious dust was irrigated out of   the kidney.  Each of the calices was inspected on 2 occasions to make sure   that there were no large residual fragments.  I did collect some small   fragments using a basket.  These were submitted as right renal stones and   stones later collected from the bladder were submitted as right renal stones   as well.    After inspecting the ureter and seeing no stone within the ureter as I   withdrew the ureteroscope and sheath together, the bladder was then   reinspected.  Small fragments were irrigated from the bladder and a small bit   of clot evacuated from the bladder.  No remaining foreign bodies, tumors, or   stones were then noted on smooth mucosa.  The bladder was left empty.  The   patient was sent to recovery room in stable condition.  He suffered no   intraoperative  complications.       ____________________________________     MD GUILLE Hernández    DD:  11/13/2019 15:43:38  DT:  11/13/2019 19:10:47    D#:  [id number]  Job#:  [id number]

## 2019-11-14 NOTE — DISCHARGE INSTRUCTIONS
ACTIVITY: Rest and take it easy for the first 24 hours.  A responsible adult is recommended to remain with you during that time.  It is normal to feel sleepy.  We encourage you to not do anything that requires balance, judgment or coordination.    MILD FLU-LIKE SYMPTOMS ARE NORMAL. YOU MAY EXPERIENCE GENERALIZED MUSCLE ACHES, THROAT IRRITATION, HEADACHE AND/OR SOME NAUSEA.    FOR 24 HOURS DO NOT:  Drive, operate machinery or run household appliances.  Drink beer or alcoholic beverages.   Make important decisions or sign legal documents.      DIET: To avoid nausea, slowly advance diet as tolerated, avoiding spicy or greasy foods for the first day.  Add more substantial food to your diet according to your physician's instructions.  INCREASE FLUIDS AND FIBER TO AVOID CONSTIPATION.    SURGICAL DRESSING/BATHING: You may shower, no bath tubs, hot tubs or swimming pools until OK by your doctor    FOLLOW-UP APPOINTMENT:  A follow-up appointment should be arranged with your doctor in 1-2 weeks; call to schedule.    You should CALL YOUR PHYSICIAN if you develop:  Fever greater than 101 degrees F.  Pain not relieved by medication, or persistent nausea or vomiting.  Excessive bleeding (blood soaking through dressing) or unexpected drainage from the wound.  Extreme redness or swelling around the incision site, drainage of pus or foul smelling drainage.  Inability to urinate or empty your bladder within 8 hours.  Problems with breathing or chest pain.    You should call 911 if you develop problems with breathing or chest pain.  If you are unable to contact your doctor or surgical center, you should go to the nearest emergency room or urgent care center.    Dr Dobbins-Physician's telephone #: [phone]    If any questions arise, call your doctor.  If your doctor is not available, please feel free to call the Surgical Center at [phone].  The Center is open Monday through Friday from 7AM to 7PM.  You can also call the  HEALTH HOTLINE open 24 hours/day, 7 days/week and speak to a nurse at [phone], or toll free at [phone].    A registered nurse may call you a few days after your surgery to see how you are doing after your procedure.    MEDICATIONS: Resume taking daily medication.  Take prescribed pain medication with food.  If no medication is prescribed, you may take non-aspirin pain medication if needed.  PAIN MEDICATION CAN BE VERY CONSTIPATING.  Take a stool softener or laxative such as senokot, pericolace, or milk of magnesia if needed.    Prescription given for Cipro (antibiotic).  Last pain medication given at Oxycodone 10mg at 4:21pm.    If your physician has prescribed pain medication that includes Acetaminophen (Tylenol), do not take additional Acetaminophen (Tylenol) while taking the prescribed medication.    Depression / Suicide Risk    As you are discharged from this [facility] facility, it is important to learn how to keep safe from harming yourself.    Recognize the warning signs:  · Abrupt changes in personality, positive or negative- including increase in energy   · Giving away possessions  · Change in eating patterns- significant weight changes-  positive or negative  · Change in sleeping patterns- unable to sleep or sleeping all the time   · Unwillingness or inability to communicate  · Depression  · Unusual sadness, discouragement and loneliness  · Talk of wanting to die  · Neglect of personal appearance   · Rebelliousness- reckless behavior  · Withdrawal from people/activities they love  · Confusion- inability to concentrate     If you or a loved one observes any of these behaviors or has concerns about self-harm, here's what you can do:  · Talk about it- your feelings and reasons for harming yourself  · Remove any means that you might use to hurt yourself (examples: pills, rope, extension cords, firearm)  · Get professional help from the community (Mental Health, Substance Abuse, psychological  counseling)  · Do not be alone:Call your Safe Contact- someone whom you trust who will be there for you.  · Call your local CRISIS HOTLINE 830-1194 or 996-837-0278  · Call your local Children's Mobile Crisis Response Team [place] (655) 878-5763 or www.[employer]  · Call the toll free National Suicide Prevention Hotlines   · National Suicide Prevention Lifeline 474-601-XPUQ (8719)  · National Hope Line Network 800-SUICIDE (201-5282)

## 2019-11-14 NOTE — OR NURSING
Dr Gar returns page.   Update. Pt having pain despite fentanyl  200 mcgs iv, dilaudid 0.4 mg iv, oxycodone 10 mg po.rates 6-7/10. Restless. Pt had lithotripsy today w/ Dr Dobbins.    Dr Gar orders toradol 30 mg iv

## 2019-11-14 NOTE — PROGRESS NOTES
Pt arrived to floor from PACU. Pt A+Ox4, able to make needs known, PIV Patent and SL. Pain 4/10 to abd.   CSMT's and SHRAVAN's WNL, Ice pack applied.  Educated pt on Incentive Spirometer.     Pt's VSS; denies N/V; states pain is 4/10 but tolerable level. D/c orders received. IV dc'd. Pt changed into clothing with assistance. Discharge instructions given; pt and family verbalized understanding and questions answered. Patient states ready to d/c home. Prescriptions given. Pt  Completed phase 2.

## 2019-11-15 ENCOUNTER — HOSPITAL ENCOUNTER (INPATIENT)
Facility: MEDICAL CENTER | Age: 64
LOS: 2 days | DRG: 660 | End: 2019-11-18
Attending: EMERGENCY MEDICINE | Admitting: INTERNAL MEDICINE
Payer: MEDICARE

## 2019-11-15 ENCOUNTER — APPOINTMENT (OUTPATIENT)
Dept: RADIOLOGY | Facility: MEDICAL CENTER | Age: 64
DRG: 660 | End: 2019-11-15
Attending: EMERGENCY MEDICINE
Payer: MEDICARE

## 2019-11-15 DIAGNOSIS — N23 RENAL COLIC ON RIGHT SIDE: ICD-10-CM

## 2019-11-15 DIAGNOSIS — N20.1 URETERAL STONE: ICD-10-CM

## 2019-11-15 LAB
ALBUMIN SERPL BCP-MCNC: 3.8 G/DL (ref 3.2–4.9)
ALBUMIN/GLOB SERPL: 1.1 G/DL
ALP SERPL-CCNC: 120 U/L (ref 30–99)
ALT SERPL-CCNC: 44 U/L (ref 2–50)
ANION GAP SERPL CALC-SCNC: 7 MMOL/L (ref 0–11.9)
APPEARANCE UR: ABNORMAL
AST SERPL-CCNC: 47 U/L (ref 12–45)
BACTERIA #/AREA URNS HPF: ABNORMAL /HPF
BASOPHILS # BLD AUTO: 0.2 % (ref 0–1.8)
BASOPHILS # BLD: 0.02 K/UL (ref 0–0.12)
BILIRUB SERPL-MCNC: 1.1 MG/DL (ref 0.1–1.5)
BILIRUB UR QL STRIP.AUTO: NEGATIVE
BUN SERPL-MCNC: 15 MG/DL (ref 8–22)
CALCIUM SERPL-MCNC: 10.1 MG/DL (ref 8.5–10.5)
CHLORIDE SERPL-SCNC: 102 MMOL/L (ref 96–112)
CO2 SERPL-SCNC: 28 MMOL/L (ref 20–33)
COLOR UR: YELLOW
CREAT SERPL-MCNC: 1.25 MG/DL (ref 0.5–1.4)
EOSINOPHIL # BLD AUTO: 0.18 K/UL (ref 0–0.51)
EOSINOPHIL NFR BLD: 1.7 % (ref 0–6.9)
EPI CELLS #/AREA URNS HPF: NEGATIVE /HPF
ERYTHROCYTE [DISTWIDTH] IN BLOOD BY AUTOMATED COUNT: 49.6 FL (ref 35.9–50)
GLOBULIN SER CALC-MCNC: 3.6 G/DL (ref 1.9–3.5)
GLUCOSE SERPL-MCNC: 99 MG/DL (ref 65–99)
GLUCOSE UR STRIP.AUTO-MCNC: NEGATIVE MG/DL
HCT VFR BLD AUTO: 45.9 % (ref 42–52)
HGB BLD-MCNC: 15.4 G/DL (ref 14–18)
HYALINE CASTS #/AREA URNS LPF: ABNORMAL /LPF
IMM GRANULOCYTES # BLD AUTO: 0.05 K/UL (ref 0–0.11)
IMM GRANULOCYTES NFR BLD AUTO: 0.5 % (ref 0–0.9)
KETONES UR STRIP.AUTO-MCNC: NEGATIVE MG/DL
LEUKOCYTE ESTERASE UR QL STRIP.AUTO: ABNORMAL
LYMPHOCYTES # BLD AUTO: 1.16 K/UL (ref 1–4.8)
LYMPHOCYTES NFR BLD: 11.3 % (ref 22–41)
MCH RBC QN AUTO: 29.2 PG (ref 27–33)
MCHC RBC AUTO-ENTMCNC: 33.6 G/DL (ref 33.7–35.3)
MCV RBC AUTO: 86.9 FL (ref 81.4–97.8)
MICRO URNS: ABNORMAL
MONOCYTES # BLD AUTO: 1.29 K/UL (ref 0–0.85)
MONOCYTES NFR BLD AUTO: 12.5 % (ref 0–13.4)
NEUTROPHILS # BLD AUTO: 7.59 K/UL (ref 1.82–7.42)
NEUTROPHILS NFR BLD: 73.8 % (ref 44–72)
NITRITE UR QL STRIP.AUTO: NEGATIVE
NRBC # BLD AUTO: 0 K/UL
NRBC BLD-RTO: 0 /100 WBC
PH UR STRIP.AUTO: 8.5 [PH] (ref 5–8)
PLATELET # BLD AUTO: 222 K/UL (ref 164–446)
PMV BLD AUTO: 9.8 FL (ref 9–12.9)
POTASSIUM SERPL-SCNC: 3.8 MMOL/L (ref 3.6–5.5)
PROT SERPL-MCNC: 7.4 G/DL (ref 6–8.2)
PROT UR QL STRIP: NEGATIVE MG/DL
RBC # BLD AUTO: 5.28 M/UL (ref 4.7–6.1)
RBC # URNS HPF: ABNORMAL /HPF
RBC UR QL AUTO: ABNORMAL
RENAL EPI CELLS #/AREA URNS HPF: NEGATIVE /HPF
SODIUM SERPL-SCNC: 137 MMOL/L (ref 135–145)
SP GR UR STRIP.AUTO: 1.01
TRANS CELLS #/AREA URNS HPF: NEGATIVE /HPF
UROBILINOGEN UR STRIP.AUTO-MCNC: 0.2 MG/DL
WBC # BLD AUTO: 10.3 K/UL (ref 4.8–10.8)
WBC #/AREA URNS HPF: ABNORMAL /HPF

## 2019-11-15 PROCEDURE — 85025 COMPLETE CBC W/AUTO DIFF WBC: CPT

## 2019-11-15 PROCEDURE — 96376 TX/PRO/DX INJ SAME DRUG ADON: CPT

## 2019-11-15 PROCEDURE — 80053 COMPREHEN METABOLIC PANEL: CPT

## 2019-11-15 PROCEDURE — 700111 HCHG RX REV CODE 636 W/ 250 OVERRIDE (IP): Performed by: EMERGENCY MEDICINE

## 2019-11-15 PROCEDURE — 96374 THER/PROPH/DIAG INJ IV PUSH: CPT

## 2019-11-15 PROCEDURE — 700105 HCHG RX REV CODE 258: Performed by: EMERGENCY MEDICINE

## 2019-11-15 PROCEDURE — 94760 N-INVAS EAR/PLS OXIMETRY 1: CPT

## 2019-11-15 PROCEDURE — 81001 URINALYSIS AUTO W/SCOPE: CPT

## 2019-11-15 PROCEDURE — 74176 CT ABD & PELVIS W/O CONTRAST: CPT

## 2019-11-15 PROCEDURE — 99285 EMERGENCY DEPT VISIT HI MDM: CPT

## 2019-11-15 PROCEDURE — 96375 TX/PRO/DX INJ NEW DRUG ADDON: CPT

## 2019-11-15 RX ORDER — HYDROMORPHONE HYDROCHLORIDE 1 MG/ML
0.5 INJECTION, SOLUTION INTRAMUSCULAR; INTRAVENOUS; SUBCUTANEOUS ONCE
Status: COMPLETED | OUTPATIENT
Start: 2019-11-15 | End: 2019-11-15

## 2019-11-15 RX ORDER — SODIUM CHLORIDE 9 MG/ML
1000 INJECTION, SOLUTION INTRAVENOUS ONCE
Status: COMPLETED | OUTPATIENT
Start: 2019-11-15 | End: 2019-11-16

## 2019-11-15 RX ORDER — ONDANSETRON 2 MG/ML
4 INJECTION INTRAMUSCULAR; INTRAVENOUS ONCE
Status: COMPLETED | OUTPATIENT
Start: 2019-11-15 | End: 2019-11-15

## 2019-11-15 RX ORDER — KETOROLAC TROMETHAMINE 30 MG/ML
30 INJECTION, SOLUTION INTRAMUSCULAR; INTRAVENOUS ONCE
Status: COMPLETED | OUTPATIENT
Start: 2019-11-16 | End: 2019-11-16

## 2019-11-15 RX ORDER — HYDROMORPHONE HYDROCHLORIDE 2 MG/ML
INJECTION, SOLUTION INTRAMUSCULAR; INTRAVENOUS; SUBCUTANEOUS
Status: DISCONTINUED
Start: 2019-11-15 | End: 2019-11-15

## 2019-11-15 RX ORDER — HYDROMORPHONE HYDROCHLORIDE 1 MG/ML
0.5 INJECTION, SOLUTION INTRAMUSCULAR; INTRAVENOUS; SUBCUTANEOUS ONCE
Status: DISCONTINUED | OUTPATIENT
Start: 2019-11-15 | End: 2019-11-15

## 2019-11-15 RX ORDER — HYDROMORPHONE HYDROCHLORIDE 1 MG/ML
0.5 INJECTION, SOLUTION INTRAMUSCULAR; INTRAVENOUS; SUBCUTANEOUS ONCE
Status: COMPLETED | OUTPATIENT
Start: 2019-11-16 | End: 2019-11-15

## 2019-11-15 RX ADMIN — ONDANSETRON 4 MG: 2 INJECTION INTRAMUSCULAR; INTRAVENOUS at 23:13

## 2019-11-15 RX ADMIN — HYDROMORPHONE HYDROCHLORIDE 0.5 MG: 1 INJECTION, SOLUTION INTRAMUSCULAR; INTRAVENOUS; SUBCUTANEOUS at 23:40

## 2019-11-15 RX ADMIN — SODIUM CHLORIDE 1000 ML: 9 INJECTION, SOLUTION INTRAVENOUS at 23:16

## 2019-11-15 RX ADMIN — HYDROMORPHONE HYDROCHLORIDE 0.5 MG: 1 INJECTION, SOLUTION INTRAMUSCULAR; INTRAVENOUS; SUBCUTANEOUS at 23:13

## 2019-11-15 ASSESSMENT — LIFESTYLE VARIABLES: DO YOU DRINK ALCOHOL: NO

## 2019-11-16 ENCOUNTER — APPOINTMENT (OUTPATIENT)
Dept: RADIOLOGY | Facility: MEDICAL CENTER | Age: 64
DRG: 660 | End: 2019-11-16
Attending: INTERNAL MEDICINE
Payer: MEDICARE

## 2019-11-16 PROBLEM — N13.30 HYDRONEPHROSIS: Status: ACTIVE | Noted: 2019-11-16

## 2019-11-16 PROBLEM — I10 HYPERTENSION: Status: ACTIVE | Noted: 2019-11-16

## 2019-11-16 LAB
ALBUMIN SERPL BCP-MCNC: 3 G/DL (ref 3.2–4.9)
ALBUMIN/GLOB SERPL: 1 G/DL
ALP SERPL-CCNC: 103 U/L (ref 30–99)
ALT SERPL-CCNC: 45 U/L (ref 2–50)
ANION GAP SERPL CALC-SCNC: 7 MMOL/L (ref 0–11.9)
APPEARANCE STONE: NORMAL
AST SERPL-CCNC: 43 U/L (ref 12–45)
BASOPHILS # BLD AUTO: 0.3 % (ref 0–1.8)
BASOPHILS # BLD: 0.03 K/UL (ref 0–0.12)
BILIRUB SERPL-MCNC: 0.9 MG/DL (ref 0.1–1.5)
BUN SERPL-MCNC: 12 MG/DL (ref 8–22)
CALCIUM SERPL-MCNC: 8.8 MG/DL (ref 8.5–10.5)
CALCIUM SERPL-MCNC: 8.8 MG/DL (ref 8.5–10.5)
CHLORIDE SERPL-SCNC: 106 MMOL/L (ref 96–112)
CHLORIDE UR-SCNC: 104 MMOL/L
CO2 SERPL-SCNC: 26 MMOL/L (ref 20–33)
COMPN STONE: NORMAL
CREAT SERPL-MCNC: 0.99 MG/DL (ref 0.5–1.4)
EOSINOPHIL # BLD AUTO: 0.18 K/UL (ref 0–0.51)
EOSINOPHIL NFR BLD: 1.7 % (ref 0–6.9)
ERYTHROCYTE [DISTWIDTH] IN BLOOD BY AUTOMATED COUNT: 49.2 FL (ref 35.9–50)
GLOBULIN SER CALC-MCNC: 3 G/DL (ref 1.9–3.5)
GLUCOSE SERPL-MCNC: 92 MG/DL (ref 65–99)
HCT VFR BLD AUTO: 40.9 % (ref 42–52)
HGB BLD-MCNC: 13.8 G/DL (ref 14–18)
IMM GRANULOCYTES # BLD AUTO: 0.07 K/UL (ref 0–0.11)
IMM GRANULOCYTES NFR BLD AUTO: 0.7 % (ref 0–0.9)
LYMPHOCYTES # BLD AUTO: 0.99 K/UL (ref 1–4.8)
LYMPHOCYTES NFR BLD: 9.5 % (ref 22–41)
MCH RBC QN AUTO: 29.4 PG (ref 27–33)
MCHC RBC AUTO-ENTMCNC: 33.7 G/DL (ref 33.7–35.3)
MCV RBC AUTO: 87 FL (ref 81.4–97.8)
MONOCYTES # BLD AUTO: 1.63 K/UL (ref 0–0.85)
MONOCYTES NFR BLD AUTO: 15.7 % (ref 0–13.4)
NEUTROPHILS # BLD AUTO: 7.51 K/UL (ref 1.82–7.42)
NEUTROPHILS NFR BLD: 72.1 % (ref 44–72)
NRBC # BLD AUTO: 0 K/UL
NRBC BLD-RTO: 0 /100 WBC
NUMBER STONE: NORMAL
PLATELET # BLD AUTO: 170 K/UL (ref 164–446)
PMV BLD AUTO: 9.2 FL (ref 9–12.9)
POTASSIUM SERPL-SCNC: 4 MMOL/L (ref 3.6–5.5)
POTASSIUM UR-SCNC: 30.5 MMOL/L
PROT SERPL-MCNC: 6 G/DL (ref 6–8.2)
PTH-INTACT SERPL-MCNC: 117.7 PG/ML (ref 14–72)
RBC # BLD AUTO: 4.7 M/UL (ref 4.7–6.1)
SIZE STONE: NORMAL MM
SODIUM SERPL-SCNC: 139 MMOL/L (ref 135–145)
SODIUM UR-SCNC: 100 MMOL/L
SPECIMEN WT: 115 MG
WBC # BLD AUTO: 10.4 K/UL (ref 4.8–10.8)

## 2019-11-16 PROCEDURE — 700111 HCHG RX REV CODE 636 W/ 250 OVERRIDE (IP): Performed by: EMERGENCY MEDICINE

## 2019-11-16 PROCEDURE — 36415 COLL VENOUS BLD VENIPUNCTURE: CPT

## 2019-11-16 PROCEDURE — 700111 HCHG RX REV CODE 636 W/ 250 OVERRIDE (IP): Performed by: STUDENT IN AN ORGANIZED HEALTH CARE EDUCATION/TRAINING PROGRAM

## 2019-11-16 PROCEDURE — 85025 COMPLETE CBC W/AUTO DIFF WBC: CPT

## 2019-11-16 PROCEDURE — 700102 HCHG RX REV CODE 250 W/ 637 OVERRIDE(OP): Performed by: STUDENT IN AN ORGANIZED HEALTH CARE EDUCATION/TRAINING PROGRAM

## 2019-11-16 PROCEDURE — 770006 HCHG ROOM/CARE - MED/SURG/GYN SEMI*

## 2019-11-16 PROCEDURE — 76536 US EXAM OF HEAD AND NECK: CPT

## 2019-11-16 PROCEDURE — A9270 NON-COVERED ITEM OR SERVICE: HCPCS | Performed by: STUDENT IN AN ORGANIZED HEALTH CARE EDUCATION/TRAINING PROGRAM

## 2019-11-16 PROCEDURE — 84439 ASSAY OF FREE THYROXINE: CPT

## 2019-11-16 PROCEDURE — 84481 FREE ASSAY (FT-3): CPT

## 2019-11-16 PROCEDURE — 84443 ASSAY THYROID STIM HORMONE: CPT

## 2019-11-16 PROCEDURE — 700111 HCHG RX REV CODE 636 W/ 250 OVERRIDE (IP): Performed by: INTERNAL MEDICINE

## 2019-11-16 PROCEDURE — 700105 HCHG RX REV CODE 258: Performed by: STUDENT IN AN ORGANIZED HEALTH CARE EDUCATION/TRAINING PROGRAM

## 2019-11-16 PROCEDURE — 80053 COMPREHEN METABOLIC PANEL: CPT

## 2019-11-16 PROCEDURE — 82340 ASSAY OF CALCIUM IN URINE: CPT

## 2019-11-16 PROCEDURE — 83970 ASSAY OF PARATHORMONE: CPT

## 2019-11-16 PROCEDURE — 84133 ASSAY OF URINE POTASSIUM: CPT

## 2019-11-16 PROCEDURE — 99223 1ST HOSP IP/OBS HIGH 75: CPT | Mod: AI,GC | Performed by: INTERNAL MEDICINE

## 2019-11-16 PROCEDURE — 84300 ASSAY OF URINE SODIUM: CPT

## 2019-11-16 PROCEDURE — 82436 ASSAY OF URINE CHLORIDE: CPT

## 2019-11-16 RX ORDER — POLYETHYLENE GLYCOL 3350 17 G/17G
1 POWDER, FOR SOLUTION ORAL
Status: DISCONTINUED | OUTPATIENT
Start: 2019-11-16 | End: 2019-11-18 | Stop reason: HOSPADM

## 2019-11-16 RX ORDER — HYDROMORPHONE HYDROCHLORIDE 1 MG/ML
1 INJECTION, SOLUTION INTRAMUSCULAR; INTRAVENOUS; SUBCUTANEOUS EVERY 4 HOURS PRN
Status: DISCONTINUED | OUTPATIENT
Start: 2019-11-16 | End: 2019-11-16

## 2019-11-16 RX ORDER — CIPROFLOXACIN 500 MG/1
500 TABLET, FILM COATED ORAL 2 TIMES DAILY
Status: COMPLETED | OUTPATIENT
Start: 2019-11-16 | End: 2019-11-16

## 2019-11-16 RX ORDER — ACETAMINOPHEN 325 MG/1
650 TABLET ORAL EVERY 6 HOURS PRN
Status: DISCONTINUED | OUTPATIENT
Start: 2019-11-16 | End: 2019-11-16

## 2019-11-16 RX ORDER — HYDROMORPHONE HYDROCHLORIDE 1 MG/ML
1 INJECTION, SOLUTION INTRAMUSCULAR; INTRAVENOUS; SUBCUTANEOUS EVERY 4 HOURS PRN
Status: DISCONTINUED | OUTPATIENT
Start: 2019-11-16 | End: 2019-11-17

## 2019-11-16 RX ORDER — BISACODYL 10 MG
10 SUPPOSITORY, RECTAL RECTAL
Status: DISCONTINUED | OUTPATIENT
Start: 2019-11-16 | End: 2019-11-18 | Stop reason: HOSPADM

## 2019-11-16 RX ORDER — HEPARIN SODIUM 5000 [USP'U]/ML
5000 INJECTION, SOLUTION INTRAVENOUS; SUBCUTANEOUS EVERY 8 HOURS
Status: DISCONTINUED | OUTPATIENT
Start: 2019-11-16 | End: 2019-11-16

## 2019-11-16 RX ORDER — AMOXICILLIN 250 MG
2 CAPSULE ORAL 2 TIMES DAILY
Status: DISCONTINUED | OUTPATIENT
Start: 2019-11-16 | End: 2019-11-18 | Stop reason: HOSPADM

## 2019-11-16 RX ORDER — TAMSULOSIN HYDROCHLORIDE 0.4 MG/1
0.4 CAPSULE ORAL
Status: DISCONTINUED | OUTPATIENT
Start: 2019-11-16 | End: 2019-11-18 | Stop reason: HOSPADM

## 2019-11-16 RX ORDER — ACETAMINOPHEN 325 MG/1
650 TABLET ORAL EVERY 6 HOURS
Status: DISCONTINUED | OUTPATIENT
Start: 2019-11-16 | End: 2019-11-17

## 2019-11-16 RX ORDER — MORPHINE SULFATE 4 MG/ML
2 INJECTION, SOLUTION INTRAMUSCULAR; INTRAVENOUS ONCE
Status: COMPLETED | OUTPATIENT
Start: 2019-11-16 | End: 2019-11-17

## 2019-11-16 RX ORDER — MORPHINE SULFATE 4 MG/ML
3 INJECTION, SOLUTION INTRAMUSCULAR; INTRAVENOUS ONCE
Status: COMPLETED | OUTPATIENT
Start: 2019-11-16 | End: 2019-11-16

## 2019-11-16 RX ORDER — SODIUM CHLORIDE 9 MG/ML
INJECTION, SOLUTION INTRAVENOUS CONTINUOUS
Status: DISCONTINUED | OUTPATIENT
Start: 2019-11-16 | End: 2019-11-18 | Stop reason: HOSPADM

## 2019-11-16 RX ORDER — ACETAMINOPHEN 500 MG
500 TABLET ORAL EVERY 6 HOURS PRN
Status: DISCONTINUED | OUTPATIENT
Start: 2019-11-16 | End: 2019-11-16

## 2019-11-16 RX ORDER — OXYCODONE HYDROCHLORIDE 5 MG/1
5 TABLET ORAL EVERY 4 HOURS PRN
Status: DISCONTINUED | OUTPATIENT
Start: 2019-11-16 | End: 2019-11-18 | Stop reason: HOSPADM

## 2019-11-16 RX ADMIN — ACETAMINOPHEN 650 MG: 325 TABLET, FILM COATED ORAL at 13:06

## 2019-11-16 RX ADMIN — SENNOSIDES AND DOCUSATE SODIUM 2 TABLET: 8.6; 5 TABLET ORAL at 18:00

## 2019-11-16 RX ADMIN — MORPHINE SULFATE 3 MG: 4 INJECTION INTRAVENOUS at 15:13

## 2019-11-16 RX ADMIN — ACETAMINOPHEN 650 MG: 325 TABLET, FILM COATED ORAL at 17:08

## 2019-11-16 RX ADMIN — TAMSULOSIN HYDROCHLORIDE 0.4 MG: 0.4 CAPSULE ORAL at 09:21

## 2019-11-16 RX ADMIN — CIPROFLOXACIN HYDROCHLORIDE 500 MG: 500 TABLET, FILM COATED ORAL at 06:34

## 2019-11-16 RX ADMIN — OXYCODONE HYDROCHLORIDE 5 MG: 5 TABLET ORAL at 20:54

## 2019-11-16 RX ADMIN — OXYCODONE HYDROCHLORIDE 5 MG: 5 TABLET ORAL at 13:06

## 2019-11-16 RX ADMIN — CIPROFLOXACIN HYDROCHLORIDE 500 MG: 500 TABLET, FILM COATED ORAL at 18:00

## 2019-11-16 RX ADMIN — HYDROMORPHONE HYDROCHLORIDE 1 MG: 1 INJECTION, SOLUTION INTRAMUSCULAR; INTRAVENOUS; SUBCUTANEOUS at 03:54

## 2019-11-16 RX ADMIN — SODIUM CHLORIDE: 9 INJECTION, SOLUTION INTRAVENOUS at 03:23

## 2019-11-16 RX ADMIN — KETOROLAC TROMETHAMINE 30 MG: 30 INJECTION, SOLUTION INTRAMUSCULAR at 02:37

## 2019-11-16 RX ADMIN — ENOXAPARIN SODIUM 40 MG: 100 INJECTION SUBCUTANEOUS at 09:21

## 2019-11-16 RX ADMIN — ACETAMINOPHEN 650 MG: 325 TABLET, FILM COATED ORAL at 09:21

## 2019-11-16 RX ADMIN — SODIUM CHLORIDE: 9 INJECTION, SOLUTION INTRAVENOUS at 11:24

## 2019-11-16 RX ADMIN — HYDROMORPHONE HYDROCHLORIDE 1 MG: 1 INJECTION, SOLUTION INTRAMUSCULAR; INTRAVENOUS; SUBCUTANEOUS at 17:14

## 2019-11-16 ASSESSMENT — COGNITIVE AND FUNCTIONAL STATUS - GENERAL
MOBILITY SCORE: 24
SUGGESTED CMS G CODE MODIFIER DAILY ACTIVITY: CH
SUGGESTED CMS G CODE MODIFIER MOBILITY: CH
DAILY ACTIVITIY SCORE: 24

## 2019-11-16 ASSESSMENT — LIFESTYLE VARIABLES
TOTAL SCORE: 0
ON A TYPICAL DAY WHEN YOU DRINK ALCOHOL HOW MANY DRINKS DO YOU HAVE: 0
EVER HAD A DRINK FIRST THING IN THE MORNING TO STEADY YOUR NERVES TO GET RID OF A HANGOVER: NO
DOES PATIENT WANT TO STOP DRINKING: NO
CONSUMPTION TOTAL: NEGATIVE
ALCOHOL_USE: NO
TOTAL SCORE: 0
HAVE YOU EVER FELT YOU SHOULD CUT DOWN ON YOUR DRINKING: NO
HAVE PEOPLE ANNOYED YOU BY CRITICIZING YOUR DRINKING: NO
AVERAGE NUMBER OF DAYS PER WEEK YOU HAVE A DRINK CONTAINING ALCOHOL: 0
TOTAL SCORE: 0
HOW MANY TIMES IN THE PAST YEAR HAVE YOU HAD 5 OR MORE DRINKS IN A DAY: 0
EVER_SMOKED: YES
EVER FELT BAD OR GUILTY ABOUT YOUR DRINKING: NO

## 2019-11-16 ASSESSMENT — COPD QUESTIONNAIRES
IN THE PAST 12 MONTHS DO YOU DO LESS THAN YOU USED TO BECAUSE OF YOUR BREATHING PROBLEMS: DISAGREE/UNSURE
COPD SCREENING SCORE: 2
DO YOU EVER COUGH UP ANY MUCUS OR PHLEGM?: NO/ONLY WITH OCCASIONAL COLDS OR INFECTIONS
HAVE YOU SMOKED AT LEAST 100 CIGARETTES IN YOUR ENTIRE LIFE: NO/DON'T KNOW
DURING THE PAST 4 WEEKS HOW MUCH DID YOU FEEL SHORT OF BREATH: NONE/LITTLE OF THE TIME

## 2019-11-16 ASSESSMENT — ENCOUNTER SYMPTOMS
HEARTBURN: 0
DOUBLE VISION: 0
PHOTOPHOBIA: 0
SHORTNESS OF BREATH: 0
DIZZINESS: 0
NAUSEA: 0
FLANK PAIN: 1
HEADACHES: 0
SPUTUM PRODUCTION: 0
CONSTIPATION: 0
ABDOMINAL PAIN: 0
SENSORY CHANGE: 0
MYALGIAS: 0
PALPITATIONS: 0
BLOOD IN STOOL: 0
VOMITING: 0
FEVER: 0
COUGH: 0
DEPRESSION: 0
HEMOPTYSIS: 0
BLURRED VISION: 0
SPEECH CHANGE: 0
BACK PAIN: 1
NAUSEA: 1
FOCAL WEAKNESS: 0
CHILLS: 0
DIARRHEA: 0

## 2019-11-16 ASSESSMENT — PATIENT HEALTH QUESTIONNAIRE - PHQ9
1. LITTLE INTEREST OR PLEASURE IN DOING THINGS: NOT AT ALL
SUM OF ALL RESPONSES TO PHQ9 QUESTIONS 1 AND 2: 0
2. FEELING DOWN, DEPRESSED, IRRITABLE, OR HOPELESS: NOT AT ALL

## 2019-11-16 NOTE — PROGRESS NOTES
· 2 RN skin check complete with PRADEEP Lockwood.   · Devices in place PIV, .  · Skin assessed under devices yes.  · Redness to back - blanching  · Redness to sacral region - blanching  · Dry/calloused bilateral feet  · Confirmed pressure ulcers found on n.a.  · New potential pressure ulcers noted on n/a. Wound consult placed? no. Photo uploaded? no.   · The following interventions are in place pressure redistribution mattress, moisturizer, and pillows for repositioning.

## 2019-11-16 NOTE — PROGRESS NOTES
Internal Medicine Interval Note  Note Author: Jolanta Armendariz M.D.     Name Travis Oliva 1955   Age/Sex 63 y.o. male   MRN [id number]   Code Status Full code      After 5PM or if no immediate response to page, please call for cross-coverage  Attending/Team: [employer]uru/Gray  See Patient List for primary contact information  Call [phone] to page    1st Call - Day Intern (R1):   [place]  2nd Call - Day Sr. Resident (R2/R3):   Ever          Reason for interval visit  (Principal Problem)   Right flank pain     Interval Problem Daily Status Update  (24 hours, problem oriented, brief subjective history, new lab/imaging data pertinent to that problem)   Patient admitted overnight due to right flank pain after ureteral stent removal. Patient reported that pain is better controlled today. Will switch to oral pain medications   Patient denies fever, chills, urinary frequency or dysuria.   Continue Ciprofloxacin   Will switch to oral pain medication        Review of Systems   Constitutional: Negative for chills and fever.   HENT: Negative for congestion.    Eyes: Negative for blurred vision.   Respiratory: Negative for cough and shortness of breath.    Cardiovascular: Negative for chest pain, palpitations and leg swelling.   Gastrointestinal: Negative for abdominal pain, diarrhea, nausea and vomiting.   Genitourinary: Positive for flank pain. Negative for dysuria, frequency and urgency.   Musculoskeletal: Positive for back pain.   Neurological: Negative for dizziness, focal weakness and headaches.   Psychiatric/Behavioral: Negative for depression.       Disposition/Barriers to discharge:   Continue inpatient for pain management     Consultants/Specialty  Urology   PCP: Jennifer Segundo M.D.      Quality Measures  Quality-Core Measures   Reviewed items::  Labs reviewed and Medications reviewed  Faulkner catheter::  No Faulkner  DVT prophylaxis pharmacological::  Enoxaparin  (Lovenox)          Physical Exam       Vitals:    11/16/19 0315 11/16/19 0400 11/16/19 0407 11/16/19 0720   BP: 140/90   139/78   Pulse: (!) 103   96   Resp: 15   16   Temp: 36.6 °C (97.9 °F)   36.3 °C (97.4 °F)   TempSrc: Temporal   Temporal   SpO2: 91% (!) 86% 96% 98%   Weight: 78.3 kg (172 lb 9.9 oz)      Height:         Body mass index is 24.08 kg/m². Weight: 78.3 kg (172 lb 9.9 oz)  Oxygen Therapy:  Pulse Oximetry: 98 %, O2 (LPM): 2, O2 Delivery: Nasal Cannula    Physical Exam   Constitutional: He is oriented to person, place, and time and well-developed, well-nourished, and in no distress.   HENT:   Head: Normocephalic and atraumatic.   Eyes: Pupils are equal, round, and reactive to light. Conjunctivae and EOM are normal.   Neck: Normal range of motion.   Cardiovascular: Normal rate, regular rhythm and normal heart sounds.   No murmur heard.  Pulmonary/Chest: Effort normal and breath sounds normal. No respiratory distress. He has no wheezes.   Abdominal: Soft. Bowel sounds are normal. He exhibits no distension. There is no tenderness.   No CVA tenderness    Musculoskeletal: Normal range of motion.         General: No edema.   Neurological: He is alert and oriented to person, place, and time. No cranial nerve deficit.   Skin: Skin is warm. No erythema.   Psychiatric: Affect normal.   Nursing note and vitals reviewed.        Assessment/Plan     Hydronephrosis- (present on admission)  Assessment & Plan  Patient has history of nephrolithiasis.  He recently underwent stent removal on 11/13 and was discharged on 5-day course of ciprofloxacin.  CT scan showed moderate right hydronephrosis with amorphic stone debris seen in the right kidney and scattered in the proximal and midportion of the right ureter.uranalysis was positive for occult blood.  Urology was consulted who recommended that patient gets admitted for pain control.  Plan   Pain management with oxycodone 5mg Q4H for mild pain and Dilaudid 1 mg every 4 PRN  for severe pain   Started flomax   Patient took 4 days of ciprofloxacin, will continue for 1 more day.  Urine lytes, calcium - pending   PTH - pending       Hypertension- (present on admission)  Assessment & Plan  Patient has history of high blood pressure but he is not on any medication  Continue to monitor blood pressure. Will start medication if needed.

## 2019-11-16 NOTE — PROGRESS NOTES
"[facility] [place]    Progress Note    Service: [facility] [place]  Patient's Name: Travis Oliva  MRN: [id number]  Admit Date:11/15/2019  Today's Date: 11/16/2019   Room #: S521/01      Identification:  63 y.o. male with right flank pain, h/o of right nephrolithiasis and right stent placement which was removed 11/14     Overnight-Interval events:   - none Subjective/ROS:   Patient seen and examined. Pain better controlled. Feeling improved from last night.   No CP/SOB/F/C     Physical Exam:  Current Vitals:   /78   Pulse 96   Temp 36.3 °C (97.4 °F) (Temporal)   Resp 16   Ht 1.803 m (5' 11\")   Wt 78.3 kg (172 lb 9.9 oz)   SpO2 98%   BMI 24.08 kg/m²     11/14 1900 - 11/16 0659  In: 1000 [I.V.:1000]  Out: 200 [Urine:200]   GEN : NAD, A&O X4   RES:  no acute respiratory distress  :   No de paz  EXT:  No edema, SCD's in place     Labs:   Lab Results   Component Value Date/Time    WBC 10.4 11/16/2019 05:12 AM    HEMATOCRIT 40.9 (L) 11/16/2019 05:12 AM    SODIUM 139 11/16/2019 05:12 AM    POTASSIUM 4.0 11/16/2019 05:12 AM    CHLORIDE 106 11/16/2019 05:12 AM    CO2 26 11/16/2019 05:12 AM    BUN 12 11/16/2019 05:12 AM    CREATININE 0.99 11/16/2019 05:12 AM    CALCIUM 8.8 11/16/2019 05:12 AM        Lab Results   Component Value Date/Time    GLUCOSE 92 11/16/2019 05:12 AM    GLUCOSE 99 11/15/2019 10:46 PM    GLUCOSE 103 (H) 05/25/2018 02:42 PM          Assessment/Plan  Travis Oliva is a 63 y.o. male with right flank pain, h/o right ureteral stent    - continue to manage pain  - monitor labs  - no urological acute intervention while in hospital  - urology signing off and okay with discharge when cleared medically  - will need outpatient follow up with Urology, which we will arrange    Jaylan Barrow PA-C  [facility] [place]           "

## 2019-11-16 NOTE — ED NOTES
Pt to R1 in wheelchair from [facility], received report from Yamilet FERNÁNDEZ, pt in obvious discomfort.

## 2019-11-16 NOTE — SENIOR ADMIT NOTE
SENIOR ADMIT NOTE:    Claudia Mathews M.D.  Date & Time note created:    11/16/2019   2:47 AM       Chief Complaint:  Right flank pain    History of Present Illness:    The patient is a 63-year-old male with a history of BPH with outflow obstruction, sepsis secondary to UTI, who is presenting to the ED 2 days after he had a ureteral stent removed by Dr. Dobbins.  The patient has a history of bladder stones. the patient states that since then he has been having right-sided flank pain, urinary incontinence, discharge in his urine.  The patient does not have any other symptoms.  States that right-sided flank pain is 10 out of 10.  He was given ciprofloxacin for 5 days after his procedure.  No fevers, no chills.    The patient initially had the stent placed after he had an obstructed infected right ureteral calculus 3 weeks ago.  2 days ago he had the stent removed.  As per the operative report, there was a markedly encrusted stent with distal encrustations that were broken off with grasping forceps the stent was removed and there was some blood in a clot within the ureter.  There were 2 stones in the right kidney which were treated.  Stone deficit was left in place.  No stent was left.    In the ED he had a normal white count.  GFR was 58.  Urinalysis showed a large amount of occult blood.  Also some rare bacteria but no nitrites, positive leukocyte esterase.  He had a CT scan in the emergency room that showed moderate right hydronephrosis and perinephric fluid with amorphous stone debris in the right kidney and scattered in the proximal and midportion of the right ureter.  It also showed minimal calcified stone debris in the dependent portion of the bladder.  The left kidney was normal.  Dr. Garcia was contacted in the ED, and stated that we should admit for pain control.     Physical Exam:  Weight/BMI: Body mass index is 24.41 kg/m².  BP (!) 163/103   Pulse (!) 119   Temp 36.9 °C (98.4 °F)   Resp 18   Ht 1.803  "arturo (5' 11\")   Wt 79.4 kg (175 lb)   SpO2 95%   Vitals:    11/16/19 0102 11/16/19 0132 11/16/19 0201 11/16/19 0231   BP: 132/83 157/105 155/99 (!) 163/103   Pulse:  (!) 116 (!) 123 (!) 119   Resp:  16 17 18   Temp:       SpO2: 98% 96% 96% 95%   Weight:       Height:         Oxygen Therapy:  Pulse Oximetry: 95 %, O2 (LPM): 2.5, O2 Delivery: Nasal Cannula    Physical exam:  Constitutional:  No acute distress. A&O x3  HENMT:  Normocephalic, Atraumatic  Eyes:  PERRLA, EOMI, Conjunctiva normal  Neck:  Supple, Full range of motion, No stridor  Cardiovascular:  Regular rate and rhythm, No murmurs, No rubs, No gallops.   Lungs: Respiratory effort is normal, no crackles, no wheezing.  Extremities: Good pedal pulses b/l, no edema, 5/5 strength.  Abdomen: Bowel sounds x4, Soft, Non-tender, Non-distended, No guarding, No rebound, No masses.  Neurologic: Good sensations, Cranial nerves II through XII grossly intact. No focal deficits noted.    Imaging  CT-RENAL COLIC EVALUATION(A/P W/O)   Final Result         1.  Moderate right hydronephrosis and perinephric fluid with amorphous stone debris in the right kidney and scattered in the proximal and midportion of the right ureter.      2.  Minimal calcified stone debris in the dependent portion of the bladder.      3.  Residual air in the bladder and in the right renal pelvis consistent with recent instrumentation.      4.  Normal appearance of the left kidney and left ureter.      5.  No evidence of bowel obstruction.        ASSESSMENT/PLAN:  #Right-sided nephrolithiasis  #Right-sided flank pain  -Start Dilaudid for pain control.  Normally we would start Toradol but the patient's GFR has decreased and creatinine has increased.  -Start IV fluid  -Continue Ciprofloxacin for 2 days  -As per chart review the patient is not taking tamsulosin.  We will start on this admission.    For full details please refer to H&P done by Dr. Phong Mathews M.D.    "

## 2019-11-16 NOTE — ED TRIAGE NOTES
"Chief Complaint   Patient presents with   • Post-Op Complications     Ureter stent placed 11/13. PT reports right flank pain. Pt reports some blood in urine. Reports producing some urine.     BP (!) 162/112   Pulse (!) 118   Temp 36.9 °C (98.4 °F)   Resp 18   Ht 1.803 m (5' 11\")   Wt 79.4 kg (175 lb)   SpO2 97%   BMI 24.41 kg/m²     PT to ER for above complaint. To red 1.  "

## 2019-11-16 NOTE — CARE PLAN
Problem: Safety  Goal: Will remain free from injury  Outcome: PROGRESSING AS EXPECTED  Note:   Patient has fall protocols in place and is compliant with cares.      Problem: Venous Thromboembolism (VTW)/Deep Vein Thrombosis (DVT) Prevention:  Goal: Patient will participate in Venous Thrombosis (VTE)/Deep Vein Thrombosis (DVT)Prevention Measures  Intervention: Assess and monitor for anticoagulation complications  Note:   Patient is on lovenox and does range of motion exercises as appropriate.

## 2019-11-16 NOTE — PROGRESS NOTES
Patient escorted to unit with transport via gurney. Pt ambulated from gurney to bed without complication. Pt oriented to new unit, provided welcome packet, care pack, and non-slip socks. Plan of care discussed and all questions answered. Fall precautions in place - bed is in low and locked position, call light/belongings within reach.

## 2019-11-16 NOTE — ASSESSMENT & PLAN NOTE
Right flank pain on admission, patient has history of nephrolithiasis with recent lithotripsy, stent placement and stent removal which was done on 11/13 and was discharged on 5-day course of ciprofloxacin.  CT scan showed moderate right hydronephrosis with amorphic stone debris seen in the right kidney and scattered in the proximal and midportion of the right ureter.  uranalysis was positive for occult blood, positive leukocyte esterase   SIRS 1/4 due to tachycardia but no leukocytosis, tachypnea, fever,no signs of infection   Making adequate urine  Urology was consulted who recommended pain control without other surgical intervention and recommended outpatient follow up    - Pain now well controlled, on flomax  - 5 day course of cipro completed

## 2019-11-16 NOTE — CARE PLAN
Problem: Communication  Goal: The ability to communicate needs accurately and effectively will improve  Outcome: PROGRESSING AS EXPECTED  Pt oriented to unit, provided welcome packet, and care pack. Pt oriented to room, showed location of bathroom. Pt provided RN/CNA extensions. Pt appropriately demonstrates how to utilize the call light.      Problem: Knowledge Deficit  Goal: Knowledge of disease process/condition, treatment plan, diagnostic tests, and medications will improve  Outcome: PROGRESSING AS EXPECTED  Pt updated on the plan of care, all questions answered.

## 2019-11-16 NOTE — ED PROVIDER NOTES
ED Provider Note    Scribed for Babar Chavez M.D. by Hanh Chaudhary. 11/15/2019, 10:44 PM.    Primary care provider: Jennifer Segundo M.D.  Means of arrival: Walk in   History obtained from: Patient   History limited by: None     CHIEF COMPLAINT  Chief Complaint   Patient presents with   • Post-Op Complications     Ureter stent placed 11/13. PT reports right flank pain. Pt reports some blood in urine. Reports producing some urine.       HPI  Travis Macario Oliva is a 63 y.o. male who presents to the Emergency Department for evaluation of post-op complications onset 2 days. Patient reports that he had a ureter stent taken out on 11/13/19. Presents associated symptoms of right flank pain, hematuria, urinary incontinence, nausea, purulent discharge in urine. Denies emesis, chest pain, short of breath, melena. No allergies to medication.     Chart reviewed which indicated patient had multiple stones. No calcification around stent.     REVIEW OF SYSTEMS  Pertinent positives include right flank pain, hematuria, urinary incontinence, nausea, purulent discharge in urine. Pertinent negatives include emesis, chest pain, short of breath, melena.. All other systems negative.    PAST MEDICAL HISTORY   has a past medical history of Bladder stones (05/25/2018), Cataract (05/25/2018), Dental disorder, Heart burn (05/25/2018), Hypertension (05/25/2018), and Urinary bladder disorder (05/25/2018).    SURGICAL HISTORY   has a past surgical history that includes hernia repair; bladder biopsy with cystoscopy (02/2018); bladder biopsy with cystoscopy (6/1/2018); retrogrades (6/1/2018); lasertripsy (6/1/2018); ureteroscopy (N/A, 11/13/2019); lasertripsy (Right, 11/13/2019); and cystoscopy (11/13/2019).    SOCIAL HISTORY  Social History     Tobacco Use   • Smoking status: Current Every Day Smoker     Packs/day: 1.00     Years: 20.00     Pack years: 20.00     Types: Cigarettes   • Smokeless tobacco: Never Used   Substance Use  "Topics   • Alcohol use: No   • Drug use: No      Social History     Substance and Sexual Activity   Drug Use No       FAMILY HISTORY  History reviewed. No pertinent family history.    CURRENT MEDICATIONS  No current facility-administered medications on file prior to encounter.      Current Outpatient Medications on File Prior to Encounter   Medication Sig Dispense Refill   • ciprofloxacin (CIPRO) 500 MG Tab Take 1 Tab by mouth 2 times a day for 5 days. 10 Tab 0         ALLERGIES  No Known Allergies    PHYSICAL EXAM  VITAL SIGNS: BP (!) 162/112   Pulse (!) 118   Temp 36.9 °C (98.4 °F)   Resp 18   Ht 1.803 m (5' 11\")   Wt 79.4 kg (175 lb)   SpO2 97%   BMI 24.41 kg/m²     Constitutional: Well developed, Well nourished, moderate distress.   HENT: Normocephalic, Atraumatic, Oropharynx moist.   Eyes: Conjunctiva normal, No discharge.   Neck: Supple, No stridor,  Cardiovascular: Normal heart rate, Normal rhythm, No murmurs, equal pulses.   Pulmonary: Normal breath sounds, No respiratory distress, No wheezing, No rales, No rhonchi.  Chest: No chest wall tenderness or deformity.   Abdomen:Soft, RLQ abdominal tenderness and RLQ flank tenderness.  Back: No CVA tenderness.   Musculoskeletal: No major deformities noted, No tenderness.   Skin: Warm, Dry, No erythema, No rash.   Neurologic: Alert & oriented x 3, Normal motor function,  No focal deficits noted.   Psychiatric: Affect normal, Judgment normal, Mood normal.     LABS  Results for orders placed or performed during the hospital encounter of 11/15/19   CBC WITH DIFFERENTIAL   Result Value Ref Range    WBC 10.3 4.8 - 10.8 K/uL    RBC 5.28 4.70 - 6.10 M/uL    Hemoglobin 15.4 14.0 - 18.0 g/dL    Hematocrit 45.9 42.0 - 52.0 %    MCV 86.9 81.4 - 97.8 fL    MCH 29.2 27.0 - 33.0 pg    MCHC 33.6 (L) 33.7 - 35.3 g/dL    RDW 49.6 35.9 - 50.0 fL    Platelet Count 222 164 - 446 K/uL    MPV 9.8 9.0 - 12.9 fL    Neutrophils-Polys 73.80 (H) 44.00 - 72.00 %    Lymphocytes 11.30 (L) " 22.00 - 41.00 %    Monocytes 12.50 0.00 - 13.40 %    Eosinophils 1.70 0.00 - 6.90 %    Basophils 0.20 0.00 - 1.80 %    Immature Granulocytes 0.50 0.00 - 0.90 %    Nucleated RBC 0.00 /100 WBC    Neutrophils (Absolute) 7.59 (H) 1.82 - 7.42 K/uL    Lymphs (Absolute) 1.16 1.00 - 4.80 K/uL    Monos (Absolute) 1.29 (H) 0.00 - 0.85 K/uL    Eos (Absolute) 0.18 0.00 - 0.51 K/uL    Baso (Absolute) 0.02 0.00 - 0.12 K/uL    Immature Granulocytes (abs) 0.05 0.00 - 0.11 K/uL    NRBC (Absolute) 0.00 K/uL   COMP METABOLIC PANEL   Result Value Ref Range    Sodium 137 135 - 145 mmol/L    Potassium 3.8 3.6 - 5.5 mmol/L    Chloride 102 96 - 112 mmol/L    Co2 28 20 - 33 mmol/L    Anion Gap 7.0 0.0 - 11.9    Glucose 99 65 - 99 mg/dL    Bun 15 8 - 22 mg/dL    Creatinine 1.25 0.50 - 1.40 mg/dL    Calcium 10.1 8.5 - 10.5 mg/dL    AST(SGOT) 47 (H) 12 - 45 U/L    ALT(SGPT) 44 2 - 50 U/L    Alkaline Phosphatase 120 (H) 30 - 99 U/L    Total Bilirubin 1.1 0.1 - 1.5 mg/dL    Albumin 3.8 3.2 - 4.9 g/dL    Total Protein 7.4 6.0 - 8.2 g/dL    Globulin 3.6 (H) 1.9 - 3.5 g/dL    A-G Ratio 1.1 g/dL   URINALYSIS (UA)   Result Value Ref Range    Color Yellow     Character Cloudy (A)     Specific Gravity 1.015 <1.035    Ph 8.5 (A) 5.0 - 8.0    Glucose Negative Negative mg/dL    Ketones Negative Negative mg/dL    Protein Negative Negative mg/dL    Bilirubin Negative Negative    Urobilinogen, Urine 0.2 Negative    Nitrite Negative Negative    Leukocyte Esterase Moderate (A) Negative    Occult Blood Large (A) Negative    Micro Urine Req Microscopic    URINE MICROSCOPIC (W/UA)   Result Value Ref Range    WBC 2-5 (A) /hpf    RBC 20-50 (A) /hpf    Bacteria Rare (A) None /hpf    Epithelial Cells Negative /hpf    Epithelial Cells Renal Negative /hpf    Trans Epithelial Cells Negative /hpf    Hyaline Cast 0-2 /lpf   ESTIMATED GFR   Result Value Ref Range    GFR If African American >60 >60 mL/min/1.73 m 2    GFR If Non African American 58 (A) >60 mL/min/1.73 m 2        All labs reviewed by me.    RADIOLOGY  CT-RENAL COLIC EVALUATION(A/P W/O)   Final Result         1.  Moderate right hydronephrosis and perinephric fluid with amorphous stone debris in the right kidney and scattered in the proximal and midportion of the right ureter.      2.  Minimal calcified stone debris in the dependent portion of the bladder.      3.  Residual air in the bladder and in the right renal pelvis consistent with recent instrumentation.      4.  Normal appearance of the left kidney and left ureter.      5.  No evidence of bowel obstruction.        The radiologist's interpretation of all radiological studies have been reviewed by me.    COURSE & MEDICAL DECISION MAKING  Pertinent Labs & Imaging studies reviewed. (See chart for details)    10:44 PM - Patient seen and examined at bedside. Patient will be treated with Hydromorphone HCL 2mg/mL, IV fluid, Dilaudid 0.5 mg, Zofran 4 mg. Patient is receiving IV fluids for acute nausea.  Ordered CT-Renal, Urinalysis, CBC with diff., CMP to evaluate his symptoms.The differential diagnoses include but are not limited to:   Kidney stone   Surgical complications   UTI   Kindey Infection     There was an improved condition after administration of IV fluids.     11:59 PM Paged Urology     12:02 AM 1:08 AM - I discussed the patient's case and the above findings with Dr. Dee (Urology) who advised patient be admitted for pain control. If patient does not want to be admitted, then patient should be discharged with percocet.     1:09 AM Patient was reevaluated at bedside. Discussed lab and radiology results with the patient. Patient agrees to be admitted for pain control.      Medical Decision Making: At this point time I think the patient is having significant pain from residual kidney stones debris and renal colic.  Patient has required multiple doses of Dilaudid for pain control.  At this point time does not appear like he has a urinary tract infection.   Patient will be admitted for pain control and careful observation    DISPOSITION   Patient will be hospitalized to Dr. Dee in guarded condition.      FINAL IMPRESSION  1. Renal colic on right side    2. Ureteral stone          Hanh MAXWELL (Scribe), am scribing for, and in the presence of, Babar Chavez M.D.    Electronically signed by: Hanh Chaudhary (Scribe), 11/15/2019    Babar MAXWELL M.D. personally performed the services described in this documentation, as scribed by Hanh Chaudhary in my presence, and it is both accurate and complete.    C    The note accurately reflects work and decisions made by me.  Babar Chavez  11/16/2019  2:59 AM

## 2019-11-16 NOTE — ASSESSMENT & PLAN NOTE
Patient has history of high blood pressure but he is not on any medication  Blood pressure fluctuating which may be due to pain   - recommend outpatient follow up with PCP and initiation of antihypertensive medications if needed

## 2019-11-16 NOTE — H&P
Internal Medicine Admitting History and Physical    Note Author: Effie Darling M.D.       Name Travis Oliva 1955   Age/Sex 63 y.o. male   MRN [id number]   Code Status Full code      After 5PM or if no immediate response to page, please call for cross-coverage  Attending/Team: Dr Curtis/Cody See Patient List for primary contact information  Call [phone] to page    1st Call - Day Intern (R1):   Dr Genao 2nd Call - Day Sr. Resident (R2/R3):   Dr Curtis       Chief Complaint:   Right flank pain     HPI:  Mr. Robles is a 63-year-old male with past medical history significant for essential hypertension, BPH with outflow obstruction  presenting to the emergency department for evaluation of right flank pain.  Patient states that he had a ureteral stent taken out on 2019 and was discharged on the same day. He has a a history of bladder stones.Initially the stent was place after he had obstructed infected ureteral calculus 3 weeks ago, which was removed 2 days ago. Patient states that  Since then he has been experiencing right flank pain which is constant, 10 /10 in intensity, characterized as squeezing dull pain, no exacerbating or alleviating factors.  Associated with nausea but no episodes of vomiting.  Patient denies any fever/chills, no dysuria, increased frequency or urgency.  He does have hematuria and mild urinary incontinence.  Post surgery patient was prescribed ciprofloxacin for 5 days.  Patient denies chest pain shortness of breath, diarrhea constipation, numbness or weakness.  He has history of nephrolithiasis.  He denies any history of heart disease, history of stroke, diabetes.  He admits that he has hypertension but is not on any medication.  In the emergency department he is afebrile, blood pressure is 155/99, heart rate 122, respiratory rate 18, he saturating in 90s on 3 L of oxygen.  He is not on oxygen at home and denies shortness of breath.  His WBC count  is 10.3, potassium 3.8, creatinine 1.25, bun 15, GFR 58, slightly elevated AST and alkaline phosphatase, urinalysis positive for occult blood, CT renal showed moderate right hydronephrosis and perinephric fluid with amorphic stone debris's in the right kidney and scattered in the proximal and midportion of the right ureter, minimum calcified stone debris is in the dependent portion of the bladder.  He was given Zofran and Dilaudid for pain, urology was consulted in the ED recommended to admit the patient for pain control.    Review of Systems   Constitutional: Negative for chills and fever.   HENT: Negative for ear pain, hearing loss and tinnitus.    Eyes: Negative for blurred vision, double vision and photophobia.   Respiratory: Negative for cough, hemoptysis and sputum production.    Cardiovascular: Negative for chest pain, palpitations and leg swelling.   Gastrointestinal: Positive for nausea. Negative for abdominal pain, blood in stool, constipation, diarrhea, heartburn and vomiting.   Genitourinary: Positive for flank pain and hematuria. Negative for dysuria, frequency and urgency.   Musculoskeletal: Negative for myalgias.   Skin: Negative for rash.   Neurological: Negative for dizziness, sensory change, speech change and headaches.   Psychiatric/Behavioral: Negative for depression.             Past Medical History (Chronic medical problem, known complications and current treatment)    Past Medical History:   Diagnosis Date   • Bladder stones 05/25/2018   • Cataract 05/25/2018     Cataract OS, IOL OD   • Dental disorder     Full Set Dentures   • Heart burn 05/25/2018   • Hypertension 05/25/2018    Not on medication for at this time.   • Urinary bladder disorder 05/25/2018    Bladder stones          Past Surgical History:  Past Surgical History:   Procedure Laterality Date   • URETEROSCOPY N/A 11/13/2019    Procedure: URETEROSCOPY;  Surgeon: Johnny Dobbins M.D.;  Location: SURGERY [facility];  Service:  Urology   • LASERTRIPSY Right 11/13/2019    Procedure: LITHOTRIPSY, USING LASER;  Surgeon: Johnny Dobbins M.D.;  Location: SURGERY [facility];  Service: Urology   • CYSTOSCOPY  11/13/2019    Procedure: CYSTOSCOPY;  Surgeon: Johnny Dobbins M.D.;  Location: SURGERY [facility];  Service: Urology   • BLADDER BIOPSY WITH CYSTOSCOPY  6/1/2018    Procedure: BLADDER BIOPSY WITH CYSTOSCOPY - CYSTOLITHOLAPAXY, POSS BLADDER BIOPSY, URETHROGRAM;  Surgeon: Jeovany Demarco M.D.;  Location: SURGERY [facility];  Service: Urology   • RETROGRADES  6/1/2018    Procedure: RETROGRADES - POSS;  Surgeon: Jeovany Demarco M.D.;  Location: SURGERY [facility];  Service: Urology   • LASERTRIPSY  6/1/2018    Procedure: LASERTRIPSY;  Surgeon: Jeovany Demarco M.D.;  Location: SURGERY [facility];  Service: Urology   • BLADDER BIOPSY WITH CYSTOSCOPY  02/2018   • HERNIA REPAIR         Current Outpatient Medications:  Home Medications    **Home medications have not yet been reviewed for this encounter**         Medication Allergy/Sensitivities:  No Known Allergies      Family History (mandatory)   Mother has diabetes   No other significant PMH  History reviewed. No pertinent family history.    Social History (mandatory)   Smokes tobacco 20 years x 1ppd  No illicit drug use   No alcohol use    Social History     Socioeconomic History   • Marital status:      Spouse name: Not on file   • Number of children: Not on file   • Years of education: Not on file   • Highest education level: Not on file   Occupational History   • Not on file   Social Needs   • Financial resource strain: Not on file   • Food insecurity:     Worry: Not on file     Inability: Not on file   • Transportation needs:     Medical: Not on file     Non-medical: Not on file   Tobacco Use   • Smoking status: Current Every Day Smoker     Packs/day: 1.00     Years: 20.00     Pack years: 20.00     Types: Cigarettes   • Smokeless tobacco: Never Used    Substance and Sexual Activity   • Alcohol use: No   • Drug use: No   • Sexual activity: Not on file   Lifestyle   • Physical activity:     Days per week: Not on file     Minutes per session: Not on file   • Stress: Not on file   Relationships   • Social connections:     Talks on phone: Not on file     Gets together: Not on file     Attends Yarsanism service: Not on file     Active member of club or organization: Not on file     Attends meetings of clubs or organizations: Not on file     Relationship status: Not on file   • Intimate partner violence:     Fear of current or ex partner: Not on file     Emotionally abused: Not on file     Physically abused: Not on file     Forced sexual activity: Not on file   Other Topics Concern   • Not on file   Social History Narrative   • Not on file     Living situation: lives in [place]  PCP : Jennifer Segundo M.D.    Physical Exam     Vitals:    11/16/19 0102 11/16/19 0132 11/16/19 0201 11/16/19 0231   BP: 132/83 157/105 155/99 (!) 163/103   Pulse:  (!) 116 (!) 123 (!) 119   Resp:  16 17 18   Temp:       SpO2: 98% 96% 96% 95%   Weight:       Height:         Body mass index is 24.41 kg/m².  O2 therapy: Pulse Oximetry: 95 %, O2 (LPM): 2.5, O2 Delivery: Nasal Cannula    Physical Exam   Constitutional: He is oriented to person, place, and time and well-developed, well-nourished, and in no distress.   HENT:   Head: Normocephalic and atraumatic.   Eyes: Pupils are equal, round, and reactive to light. EOM are normal.   Neck: Normal range of motion. Neck supple.   Cardiovascular: Normal rate, regular rhythm, normal heart sounds and intact distal pulses. Exam reveals no gallop and no friction rub.   No murmur heard.  Pulmonary/Chest: Effort normal and breath sounds normal. No respiratory distress. He has no wheezes. He has no rales.   Abdominal: Soft. Bowel sounds are normal. He exhibits no distension. There is no tenderness. There is no rebound.   Right flank pain and tenderness  No CVA  tenderness   Musculoskeletal: Normal range of motion.   Neurological: He is alert and oriented to person, place, and time. No cranial nerve deficit. Gait normal. Coordination normal.   Skin: Skin is warm and dry.         Data Review       Old Records Request:   Completed  Current Records review/summary: Completed    Lab Data Review:  Recent Results (from the past 24 hour(s))   CBC WITH DIFFERENTIAL    Collection Time: 11/15/19 10:46 PM   Result Value Ref Range    WBC 10.3 4.8 - 10.8 K/uL    RBC 5.28 4.70 - 6.10 M/uL    Hemoglobin 15.4 14.0 - 18.0 g/dL    Hematocrit 45.9 42.0 - 52.0 %    MCV 86.9 81.4 - 97.8 fL    MCH 29.2 27.0 - 33.0 pg    MCHC 33.6 (L) 33.7 - 35.3 g/dL    RDW 49.6 35.9 - 50.0 fL    Platelet Count 222 164 - 446 K/uL    MPV 9.8 9.0 - 12.9 fL    Neutrophils-Polys 73.80 (H) 44.00 - 72.00 %    Lymphocytes 11.30 (L) 22.00 - 41.00 %    Monocytes 12.50 0.00 - 13.40 %    Eosinophils 1.70 0.00 - 6.90 %    Basophils 0.20 0.00 - 1.80 %    Immature Granulocytes 0.50 0.00 - 0.90 %    Nucleated RBC 0.00 /100 WBC    Neutrophils (Absolute) 7.59 (H) 1.82 - 7.42 K/uL    Lymphs (Absolute) 1.16 1.00 - 4.80 K/uL    Monos (Absolute) 1.29 (H) 0.00 - 0.85 K/uL    Eos (Absolute) 0.18 0.00 - 0.51 K/uL    Baso (Absolute) 0.02 0.00 - 0.12 K/uL    Immature Granulocytes (abs) 0.05 0.00 - 0.11 K/uL    NRBC (Absolute) 0.00 K/uL   COMP METABOLIC PANEL    Collection Time: 11/15/19 10:46 PM   Result Value Ref Range    Sodium 137 135 - 145 mmol/L    Potassium 3.8 3.6 - 5.5 mmol/L    Chloride 102 96 - 112 mmol/L    Co2 28 20 - 33 mmol/L    Anion Gap 7.0 0.0 - 11.9    Glucose 99 65 - 99 mg/dL    Bun 15 8 - 22 mg/dL    Creatinine 1.25 0.50 - 1.40 mg/dL    Calcium 10.1 8.5 - 10.5 mg/dL    AST(SGOT) 47 (H) 12 - 45 U/L    ALT(SGPT) 44 2 - 50 U/L    Alkaline Phosphatase 120 (H) 30 - 99 U/L    Total Bilirubin 1.1 0.1 - 1.5 mg/dL    Albumin 3.8 3.2 - 4.9 g/dL    Total Protein 7.4 6.0 - 8.2 g/dL    Globulin 3.6 (H) 1.9 - 3.5 g/dL    A-G Ratio  1.1 g/dL   ESTIMATED GFR    Collection Time: 11/15/19 10:46 PM   Result Value Ref Range    GFR If African American >60 >60 mL/min/1.73 m 2    GFR If Non African American 58 (A) >60 mL/min/1.73 m 2   URINALYSIS (UA)    Collection Time: 11/15/19 11:29 PM   Result Value Ref Range    Color Yellow     Character Cloudy (A)     Specific Gravity 1.015 <1.035    Ph 8.5 (A) 5.0 - 8.0    Glucose Negative Negative mg/dL    Ketones Negative Negative mg/dL    Protein Negative Negative mg/dL    Bilirubin Negative Negative    Urobilinogen, Urine 0.2 Negative    Nitrite Negative Negative    Leukocyte Esterase Moderate (A) Negative    Occult Blood Large (A) Negative    Micro Urine Req Microscopic    URINE MICROSCOPIC (W/UA)    Collection Time: 11/15/19 11:29 PM   Result Value Ref Range    WBC 2-5 (A) /hpf    RBC 20-50 (A) /hpf    Bacteria Rare (A) None /hpf    Epithelial Cells Negative /hpf    Epithelial Cells Renal Negative /hpf    Trans Epithelial Cells Negative /hpf    Hyaline Cast 0-2 /lpf       Imaging/Procedures Review:    Independant Imaging Review: Completed  CT-RENAL COLIC EVALUATION(A/P W/O)   Final Result         1.  Moderate right hydronephrosis and perinephric fluid with amorphous stone debris in the right kidney and scattered in the proximal and midportion of the right ureter.      2.  Minimal calcified stone debris in the dependent portion of the bladder.      3.  Residual air in the bladder and in the right renal pelvis consistent with recent instrumentation.      4.  Normal appearance of the left kidney and left ureter.      5.  No evidence of bowel obstruction.               EKG:   EKG Independent Review: Completed  QTc:449, HR: 102, Sinus Tachycardia , no ST/T changes     Records reviewed and summarized in current documentation :  Yes  [employer] teaching service handout given to patient:  Yes         Assessment/Plan     Hydronephrosis  Assessment & Plan  Patient has history of nephrolithiasis.  He recently underwent  stent removal on 11/13 and was discharged on 5-day course of ciprofloxacin.  Patient presenting to the emergency department due to worsening right flank pain, nausea and hematuria onset 3 days.He denies any fever, chills, dysuria, urgency or frequency.  CT scan showed moderate right hydronephrosis with amorphic stone debris seen in the right kidney and scattered in the proximal and midportion of the right ureter.uranalysis was positive for occult blood.  SIRS 1/4 due to tachycardia but no leukocytosis, tachypnea, fever,no signs of infection   His creatinine bumped up from 0.95-1.25, BUN 15 and GFR 58  He is able to make urine.  Urology was consulted who recommended that patient gets admitted for pain control.    Admit to the medical floor, continue pulse ox  Continue maintenance IV fluids  Pain management with Dilaudid 1 mg every 4  Patient took 4 days of ciprofloxacin, will continue for 1 more day.      Hypertension  Assessment & Plan  Patient has history of high blood pressure but he is not on any medication  On admission his blood pressure is 155/99  Continue to monitor blood pressure  Start on antihypertensive medications if needed      Anticipated Hospital stay:  >2 midnights        Quality Measures  Quality-Core Measures   Reviewed items::  Labs reviewed and Medications reviewed  Faulkner catheter::  No Faulkner  DVT prophylaxis pharmacological::  Heparin    PCP: Jennifer Segundo M.D.

## 2019-11-17 PROBLEM — R00.0 SINUS TACHYCARDIA: Status: ACTIVE | Noted: 2019-11-17

## 2019-11-17 LAB
ANION GAP SERPL CALC-SCNC: 8 MMOL/L (ref 0–11.9)
BASOPHILS # BLD AUTO: 0.5 % (ref 0–1.8)
BASOPHILS # BLD: 0.03 K/UL (ref 0–0.12)
BUN SERPL-MCNC: 11 MG/DL (ref 8–22)
CALCIUM SERPL-MCNC: 8.9 MG/DL (ref 8.5–10.5)
CHLORIDE SERPL-SCNC: 108 MMOL/L (ref 96–112)
CO2 SERPL-SCNC: 25 MMOL/L (ref 20–33)
CREAT SERPL-MCNC: 0.97 MG/DL (ref 0.5–1.4)
EOSINOPHIL # BLD AUTO: 0.27 K/UL (ref 0–0.51)
EOSINOPHIL NFR BLD: 4.1 % (ref 0–6.9)
ERYTHROCYTE [DISTWIDTH] IN BLOOD BY AUTOMATED COUNT: 48.6 FL (ref 35.9–50)
GLUCOSE SERPL-MCNC: 95 MG/DL (ref 65–99)
HCT VFR BLD AUTO: 40 % (ref 42–52)
HGB BLD-MCNC: 13.1 G/DL (ref 14–18)
IMM GRANULOCYTES # BLD AUTO: 0.03 K/UL (ref 0–0.11)
IMM GRANULOCYTES NFR BLD AUTO: 0.5 % (ref 0–0.9)
LYMPHOCYTES # BLD AUTO: 1.11 K/UL (ref 1–4.8)
LYMPHOCYTES NFR BLD: 16.9 % (ref 22–41)
MCH RBC QN AUTO: 28.3 PG (ref 27–33)
MCHC RBC AUTO-ENTMCNC: 32.8 G/DL (ref 33.7–35.3)
MCV RBC AUTO: 86.4 FL (ref 81.4–97.8)
MONOCYTES # BLD AUTO: 1.05 K/UL (ref 0–0.85)
MONOCYTES NFR BLD AUTO: 16 % (ref 0–13.4)
NEUTROPHILS # BLD AUTO: 4.06 K/UL (ref 1.82–7.42)
NEUTROPHILS NFR BLD: 62 % (ref 44–72)
NRBC # BLD AUTO: 0 K/UL
NRBC BLD-RTO: 0 /100 WBC
PHOSPHATE SERPL-MCNC: 2.4 MG/DL (ref 2.5–4.5)
PLATELET # BLD AUTO: 160 K/UL (ref 164–446)
PMV BLD AUTO: 9 FL (ref 9–12.9)
POTASSIUM SERPL-SCNC: 3.9 MMOL/L (ref 3.6–5.5)
RBC # BLD AUTO: 4.63 M/UL (ref 4.7–6.1)
SODIUM SERPL-SCNC: 141 MMOL/L (ref 135–145)
T3FREE SERPL-MCNC: 3.63 PG/ML (ref 2.4–4.2)
T4 FREE SERPL-MCNC: 1.18 NG/DL (ref 0.53–1.43)
TSH SERPL DL<=0.005 MIU/L-ACNC: 0.8 UIU/ML (ref 0.38–5.33)
WBC # BLD AUTO: 6.6 K/UL (ref 4.8–10.8)

## 2019-11-17 PROCEDURE — A9270 NON-COVERED ITEM OR SERVICE: HCPCS | Performed by: STUDENT IN AN ORGANIZED HEALTH CARE EDUCATION/TRAINING PROGRAM

## 2019-11-17 PROCEDURE — 85025 COMPLETE CBC W/AUTO DIFF WBC: CPT

## 2019-11-17 PROCEDURE — 700105 HCHG RX REV CODE 258: Performed by: STUDENT IN AN ORGANIZED HEALTH CARE EDUCATION/TRAINING PROGRAM

## 2019-11-17 PROCEDURE — 700102 HCHG RX REV CODE 250 W/ 637 OVERRIDE(OP): Performed by: STUDENT IN AN ORGANIZED HEALTH CARE EDUCATION/TRAINING PROGRAM

## 2019-11-17 PROCEDURE — 36415 COLL VENOUS BLD VENIPUNCTURE: CPT

## 2019-11-17 PROCEDURE — 84100 ASSAY OF PHOSPHORUS: CPT

## 2019-11-17 PROCEDURE — 80048 BASIC METABOLIC PNL TOTAL CA: CPT

## 2019-11-17 PROCEDURE — 700111 HCHG RX REV CODE 636 W/ 250 OVERRIDE (IP): Performed by: STUDENT IN AN ORGANIZED HEALTH CARE EDUCATION/TRAINING PROGRAM

## 2019-11-17 PROCEDURE — 770006 HCHG ROOM/CARE - MED/SURG/GYN SEMI*

## 2019-11-17 PROCEDURE — 99232 SBSQ HOSP IP/OBS MODERATE 35: CPT | Performed by: INTERNAL MEDICINE

## 2019-11-17 RX ORDER — ACETAMINOPHEN 500 MG
1000 TABLET ORAL 3 TIMES DAILY
Status: DISCONTINUED | OUTPATIENT
Start: 2019-11-17 | End: 2019-11-18 | Stop reason: HOSPADM

## 2019-11-17 RX ORDER — KETOROLAC TROMETHAMINE 10 MG/1
10 TABLET, FILM COATED ORAL EVERY 6 HOURS PRN
Status: DISCONTINUED | OUTPATIENT
Start: 2019-11-17 | End: 2019-11-18 | Stop reason: HOSPADM

## 2019-11-17 RX ORDER — OMEPRAZOLE 20 MG/1
20 CAPSULE, DELAYED RELEASE ORAL DAILY
Status: DISCONTINUED | OUTPATIENT
Start: 2019-11-17 | End: 2019-11-18 | Stop reason: HOSPADM

## 2019-11-17 RX ORDER — KETOROLAC TROMETHAMINE 30 MG/ML
30 INJECTION, SOLUTION INTRAMUSCULAR; INTRAVENOUS EVERY 6 HOURS PRN
Status: DISCONTINUED | OUTPATIENT
Start: 2019-11-17 | End: 2019-11-17

## 2019-11-17 RX ADMIN — SODIUM CHLORIDE: 9 INJECTION, SOLUTION INTRAVENOUS at 11:02

## 2019-11-17 RX ADMIN — ACETAMINOPHEN 1000 MG: 500 TABLET ORAL at 18:04

## 2019-11-17 RX ADMIN — SODIUM CHLORIDE: 9 INJECTION, SOLUTION INTRAVENOUS at 02:25

## 2019-11-17 RX ADMIN — KETOROLAC TROMETHAMINE 10 MG: 10 TABLET, FILM COATED ORAL at 22:53

## 2019-11-17 RX ADMIN — SENNOSIDES AND DOCUSATE SODIUM 2 TABLET: 8.6; 5 TABLET ORAL at 18:03

## 2019-11-17 RX ADMIN — DIBASIC SODIUM PHOSPHATE, MONOBASIC POTASSIUM PHOSPHATE AND MONOBASIC SODIUM PHOSPHATE 1 TABLET: 852; 155; 130 TABLET ORAL at 07:41

## 2019-11-17 RX ADMIN — DIBASIC SODIUM PHOSPHATE, MONOBASIC POTASSIUM PHOSPHATE AND MONOBASIC SODIUM PHOSPHATE 1 TABLET: 852; 155; 130 TABLET ORAL at 22:53

## 2019-11-17 RX ADMIN — KETOROLAC TROMETHAMINE 10 MG: 10 TABLET, FILM COATED ORAL at 16:30

## 2019-11-17 RX ADMIN — TAMSULOSIN HYDROCHLORIDE 0.4 MG: 0.4 CAPSULE ORAL at 07:42

## 2019-11-17 RX ADMIN — DIBASIC SODIUM PHOSPHATE, MONOBASIC POTASSIUM PHOSPHATE AND MONOBASIC SODIUM PHOSPHATE 1 TABLET: 852; 155; 130 TABLET ORAL at 18:04

## 2019-11-17 RX ADMIN — OMEPRAZOLE 20 MG: 20 CAPSULE, DELAYED RELEASE ORAL at 07:42

## 2019-11-17 RX ADMIN — MORPHINE SULFATE 2 MG: 4 INJECTION INTRAVENOUS at 02:20

## 2019-11-17 ASSESSMENT — ENCOUNTER SYMPTOMS
PALPITATIONS: 0
DEPRESSION: 0
COUGH: 0
DIZZINESS: 0
VOMITING: 0
ABDOMINAL PAIN: 0
FLANK PAIN: 1
BACK PAIN: 1
HEADACHES: 0
NAUSEA: 0
CHILLS: 0
SHORTNESS OF BREATH: 0
FOCAL WEAKNESS: 0
DIARRHEA: 0
FEVER: 0
BLURRED VISION: 0

## 2019-11-17 NOTE — CARE PLAN
Problem: Safety  Goal: Will remain free from falls  Outcome: PROGRESSING AS EXPECTED  Pt ambulated on unit with CNA. Pt calls appropriately, steady on feet.      Problem: Respiratory:  Goal: Respiratory status will improve  Outcome: PROGRESSING SLOWER THAN EXPECTED  Pt now on 1 L O2 via nasal cannula. Attempted to titrate O2, pt oxygen saturation 87% on room air.

## 2019-11-17 NOTE — CARE PLAN
Problem: Knowledge Deficit  Goal: Knowledge of the prescribed therapeutic regimen will improve  Outcome: PROGRESSING AS EXPECTED  Intervention: Discuss information regarding therpeutic regimen and document in education  Note:   Patient education reinforced this AM.      Problem: Pain Management  Goal: Pain level will decrease to patient's comfort goal  Outcome: PROGRESSING AS EXPECTED  Intervention: Educate and implement non-pharmacologic comfort measures. Examples: relaxation, distration, play therapy, activity therapy, massage, etc.  Flowsheets (Taken 11/17/2019 0756)  Intervention: Heat Applied  Note:   Patient encouraged to try heat/cold therapy and distraction.

## 2019-11-17 NOTE — PROGRESS NOTES
Report received from day RNCRISTIAN. Assumed care of pt at 1900. Pt appears lethargic, day RN states that patient was recently provided IV narcotics. Plan of care discussed with pt, pt had no questions. Pt is on 2 LO2 via nasal cannula. Fall precautions in place - bed is in low and locked position, call light/belongings within reach, spill/clutter free environment. RN/CNA extensions provided.

## 2019-11-17 NOTE — PROGRESS NOTES
"Report taken from night nurse and patient assumed care by this nurse at 0700. Patient given routine morning medications and declined PRN pain medication stating pain levels of 4/10. Assessment showed nothing of note compared to yesterday. /94   Pulse 97   Temp 37.2 °C (99 °F) (Temporal)   Resp 16   Ht 1.803 m (5' 11\")   Wt 78.3 kg (172 lb 9.9 oz)   SpO2 94%   BMI 24.08 kg/m² . Patient on 1L of )2 via nasal cannula. Hourly checks in place and patient fall precautions confirmed. Whiteboard updated. Will continue to monitor this shift.   "

## 2019-11-17 NOTE — PROGRESS NOTES
"Patient reports a ten out of ten \"sharp\" pain in his lower right abdomen. Patient given dilaudid per MAR and on call [facility] resident called. Patient vital signs /73   Pulse (!) 108   Temp 37 °C (98.6 °F) (Temporal)   Resp (!) 24   Ht 1.803 m (5' 11\")   Wt 78.3 kg (172 lb 9.9 oz)   SpO2 95%   BMI 24.08 kg/m² . On call [facility] resident ordered one time dose of morphine. Patient reassessed and presents very groggy and slow to respond. States pain is 4/10 when not moving. Morphine held at this time due to patient presenting as sedated and 4/10 pain. Hot pack offered/applied and patient stated understanding of holding medication. Will continue to monitor.      "

## 2019-11-18 ENCOUNTER — PATIENT OUTREACH (OUTPATIENT)
Dept: HEALTH INFORMATION MANAGEMENT | Facility: OTHER | Age: 64
End: 2019-11-18

## 2019-11-18 VITALS
SYSTOLIC BLOOD PRESSURE: 131 MMHG | DIASTOLIC BLOOD PRESSURE: 94 MMHG | BODY MASS INDEX: 24.17 KG/M2 | HEART RATE: 85 BPM | OXYGEN SATURATION: 94 % | TEMPERATURE: 98.3 F | RESPIRATION RATE: 16 BRPM | WEIGHT: 172.62 LBS | HEIGHT: 71 IN

## 2019-11-18 PROBLEM — E21.3 HYPERPARATHYROIDISM (HCC): Status: ACTIVE | Noted: 2019-11-18

## 2019-11-18 LAB
ANION GAP SERPL CALC-SCNC: 9 MMOL/L (ref 0–11.9)
BUN SERPL-MCNC: 14 MG/DL (ref 8–22)
CALCIUM 24H UR-MCNC: 6.6 MG/DL
CALCIUM 24H UR-MRATE: NORMAL MG/D
CALCIUM SERPL-MCNC: 9.3 MG/DL (ref 8.5–10.5)
CALCIUM/CREAT 24H UR: 89 MG/G (ref 20–240)
CHLORIDE SERPL-SCNC: 106 MMOL/L (ref 96–112)
CO2 SERPL-SCNC: 24 MMOL/L (ref 20–33)
COLLECT DURATION TIME SPEC: NORMAL HRS
CREAT 24H UR-MCNC: 74 MG/DL
CREAT 24H UR-MRATE: NORMAL MG/D (ref 800–2100)
CREAT SERPL-MCNC: 0.89 MG/DL (ref 0.5–1.4)
GLUCOSE SERPL-MCNC: 102 MG/DL (ref 65–99)
POTASSIUM SERPL-SCNC: 3.5 MMOL/L (ref 3.6–5.5)
SODIUM SERPL-SCNC: 139 MMOL/L (ref 135–145)
SPECIMEN VOL ?TM UR: NORMAL ML

## 2019-11-18 PROCEDURE — 80048 BASIC METABOLIC PNL TOTAL CA: CPT

## 2019-11-18 PROCEDURE — 700102 HCHG RX REV CODE 250 W/ 637 OVERRIDE(OP): Performed by: STUDENT IN AN ORGANIZED HEALTH CARE EDUCATION/TRAINING PROGRAM

## 2019-11-18 PROCEDURE — 99238 HOSP IP/OBS DSCHRG MGMT 30/<: CPT | Performed by: INTERNAL MEDICINE

## 2019-11-18 PROCEDURE — A9270 NON-COVERED ITEM OR SERVICE: HCPCS | Performed by: STUDENT IN AN ORGANIZED HEALTH CARE EDUCATION/TRAINING PROGRAM

## 2019-11-18 PROCEDURE — 36415 COLL VENOUS BLD VENIPUNCTURE: CPT

## 2019-11-18 RX ORDER — TAMSULOSIN HYDROCHLORIDE 0.4 MG/1
0.4 CAPSULE ORAL
Qty: 30 CAP | Refills: 0 | Status: SHIPPED | OUTPATIENT
Start: 2019-11-18

## 2019-11-18 RX ORDER — KETOROLAC TROMETHAMINE 10 MG/1
10 TABLET, FILM COATED ORAL EVERY 6 HOURS PRN
Qty: 15 TAB | Refills: 0 | Status: SHIPPED | OUTPATIENT
Start: 2019-11-18 | End: 2019-11-23

## 2019-11-18 RX ORDER — POTASSIUM CHLORIDE 20 MEQ/1
40 TABLET, EXTENDED RELEASE ORAL ONCE
Status: COMPLETED | OUTPATIENT
Start: 2019-11-18 | End: 2019-11-18

## 2019-11-18 RX ORDER — OMEPRAZOLE 20 MG/1
20 CAPSULE, DELAYED RELEASE ORAL DAILY
Qty: 5 CAP | Refills: 0 | Status: SHIPPED | OUTPATIENT
Start: 2019-11-19 | End: 2019-11-24

## 2019-11-18 RX ADMIN — ACETAMINOPHEN 1000 MG: 500 TABLET ORAL at 05:02

## 2019-11-18 RX ADMIN — KETOROLAC TROMETHAMINE 10 MG: 10 TABLET, FILM COATED ORAL at 05:03

## 2019-11-18 RX ADMIN — OMEPRAZOLE 20 MG: 20 CAPSULE, DELAYED RELEASE ORAL at 05:03

## 2019-11-18 RX ADMIN — TAMSULOSIN HYDROCHLORIDE 0.4 MG: 0.4 CAPSULE ORAL at 10:25

## 2019-11-18 RX ADMIN — ACETAMINOPHEN 1000 MG: 500 TABLET ORAL at 13:20

## 2019-11-18 RX ADMIN — POTASSIUM CHLORIDE 40 MEQ: 1500 TABLET, EXTENDED RELEASE ORAL at 10:24

## 2019-11-18 RX ADMIN — SENNOSIDES AND DOCUSATE SODIUM 2 TABLET: 8.6; 5 TABLET ORAL at 05:02

## 2019-11-18 ASSESSMENT — ENCOUNTER SYMPTOMS
FEVER: 0
BLURRED VISION: 0
CHILLS: 0
FLANK PAIN: 1
PALPITATIONS: 0
VOMITING: 0
HEADACHES: 0
DIZZINESS: 0
SHORTNESS OF BREATH: 0
BACK PAIN: 1
NAUSEA: 0
FOCAL WEAKNESS: 0
COUGH: 0
DIARRHEA: 0
ABDOMINAL PAIN: 0
DEPRESSION: 0

## 2019-11-18 NOTE — ASSESSMENT & PLAN NOTE
Patient with history of multiple renal stones and Calcium at the upper limits of normal   - PTH elevated at 117 suggesting hyperparathyroidism   - US done and showed no obvious parathyroid adenoma  - pt will need outpatient follow up with PCP regarding further evaluation and treatment for possible primary hyperparathyroidism, consider sestamibi scan for further evaluation for adenoma

## 2019-11-18 NOTE — PROGRESS NOTES
Pt is alert and oriented, No c/o pain. Discharge instructions given with good understanding. IV discontinue intact. Pt left with family friend at 1530 per friends car. VSS

## 2019-11-18 NOTE — PROGRESS NOTES
Internal Medicine Interval Note  Note Author: Neri Genao M.D.     Name Travis Oliva 1955   Age/Sex 63 y.o. male   MRN [id number]   Code Status Full code      After 5PM or if no immediate response to page, please call for cross-coverage  Attending/Team: Dr. Curtis / Cody See Patient List for primary contact information  Call [phone] to page    1st Call - Day Intern (R1):   [place]  2nd Call - Day Sr. Resident (R2/R3):   Ever          Reason for interval visit  (Principal Problem)   Right flank pain     Interval Problem Daily Status Update  (24 hours, problem oriented, brief subjective history, new lab/imaging data pertinent to that problem)     - Overnight events: No overnight events.  - Subjective: Patient still has pain 6/10 but refusing pain medications.  - Vitals: HR around 100-110 likely related to flank pain otherwise stable.  - Pertinent physical: Right flank tenderness with palpation.  - Labs/Cultures: Free T3, T4 and TSH is normal. Phosphorus 2.4.  - Imaging: No imaging today.  - Consults: Urology signed of yesterday. Patient Ok for discharge per uro.  - Interventions: No interventions  - Med changes: Added PO ketorolac with omeprazole. Tylenol scheduled. Dilaudid discontinued. IVF dc'ed.  - Daytime events No events.  - Discharge plan: To home after HR is controlled.       =====================================         Review of Systems   Constitutional: Negative for chills and fever.   HENT: Negative for congestion.    Eyes: Negative for blurred vision.   Respiratory: Negative for cough and shortness of breath.    Cardiovascular: Negative for chest pain, palpitations and leg swelling.   Gastrointestinal: Negative for abdominal pain, diarrhea, nausea and vomiting.   Genitourinary: Positive for flank pain. Negative for dysuria, frequency and urgency.   Musculoskeletal: Positive for back pain.   Neurological: Negative for dizziness, focal weakness and headaches.    Psychiatric/Behavioral: Negative for depression.       Disposition/Barriers to discharge:   Continue inpatient for pain management     Consultants/Specialty  Urology   PCP: Jennifer Segundo M.D.      Quality Measures  Quality-Core Measures   Reviewed items::  Labs reviewed and Medications reviewed  Faulkner catheter::  No Faulkner  DVT prophylaxis pharmacological::  Enoxaparin (Lovenox)          Physical Exam       Vitals:    11/16/19 2041 11/17/19 0400 11/17/19 0800 11/17/19 1600   BP:  140/94 156/103 116/63   Pulse:  97 98 (!) 109   Resp:  16 16 18   Temp:  37.2 °C (99 °F) 36.5 °C (97.7 °F) 37.4 °C (99.3 °F)   TempSrc:  Temporal Temporal Temporal   SpO2: 94% 94% 96% 92%   Weight:       Height:         Body mass index is 24.08 kg/m².    Oxygen Therapy:  Pulse Oximetry: 92 %, O2 (LPM): 1, O2 Delivery: Silicone Nasal Cannula    Physical Exam   Constitutional: He is oriented to person, place, and time and well-developed, well-nourished, and in no distress.   HENT:   Head: Normocephalic and atraumatic.   Eyes: Pupils are equal, round, and reactive to light. Conjunctivae and EOM are normal.   Neck: Normal range of motion.   Cardiovascular: Normal rate, regular rhythm and normal heart sounds.   No murmur heard.  Pulmonary/Chest: Effort normal and breath sounds normal. No respiratory distress. He has no wheezes.   Abdominal: Soft. Bowel sounds are normal. He exhibits no distension. There is no tenderness.   No CVA tenderness    Musculoskeletal: Normal range of motion.         General: No edema.   Neurological: He is alert and oriented to person, place, and time. No cranial nerve deficit.   Skin: Skin is warm. No erythema.   Psychiatric: Affect normal.   Nursing note and vitals reviewed.        Assessment/Plan     Problem List as of 11/17/2019 Reviewed: 11/13/2019 11:51 AM by Deisy Quiros M.D.    Hydronephrosis    Last Assessment & Plan 11/15/2019 Hospital Encounter Edited 11/17/2019  5:07 PM by Neri Genao M.D.      Patient has history of nephrolithiasis.  He recently underwent stent removal on 11/13 and was discharged on 5-day course of ciprofloxacin.  Patient presenting to the emergency department due to worsening right flank pain, nausea and hematuria onset 3 days.He denies any fever, chills, dysuria, urgency or frequency.  CT scan showed moderate right hydronephrosis with amorphic stone debris seen in the right kidney and scattered in the proximal and midportion of the right ureter.uranalysis was positive for occult blood.  SIRS 1/4 due to tachycardia but no leukocytosis, tachypnea, fever,no signs of infection   His creatinine bumped up from 0.95-1.25, BUN 15 and GFR 58  He is able to make urine.  Urology was consulted who recommended that patient gets admitted for pain control. Signed off.    Stopped IVF  Pain management with ketorolac + tylenol.  On flomax   Patient completed ciprofloxacin           Hypertension    Last Assessment & Plan 11/15/2019 Hospital Encounter Written 11/16/2019  3:07 AM by Effie Darling M.D.     Patient has history of high blood pressure but he is not on any medication  On admission his blood pressure is 155/99  Continue to monitor blood pressure  Start on antihypertensive medications if needed         Sinus tachycardia    Last Assessment & Plan 11/15/2019 Hospital Encounter Written 11/17/2019  5:06 PM by Neri Genao M.D.     Due to pain. Refusing meds.  Rehydrated.

## 2019-11-18 NOTE — PROGRESS NOTES
"Patient stated he \"just wanted to leave.\" Informed patient that he was free to leave if he wished but this nurse would like him to talk to the MD first. Patient agreed and MD called. After talking with MD patient agreed to stay and comply with medication regiment. Patient vital signs /63   Pulse (!) 109   Temp 37.4 °C (99.3 °F) (Temporal)   Resp 18   Ht 1.803 m (5' 11\")   Wt 78.3 kg (172 lb 9.9 oz)   SpO2 92%   BMI 24.08 kg/m² . Patient stated awareness that heart rate was elevated and [facility] wished to control his pulse before discharge. Will update if neccessary.   "

## 2019-11-18 NOTE — DISCHARGE INSTRUCTIONS
Discharge Instructions    Discharged to home by car with friend. Discharged via wheelchair, hospital escort: Yes.  Special equipment needed: Not Applicable    Be sure to schedule a follow-up appointment with your primary care doctor or any specialists as instructed.     Discharge Plan:   Smoking Cessation Offered: Patient Refused  Influenza Vaccine Indication: Patient Refuses    I understand that a diet low in cholesterol, fat, and sodium is recommended for good health. Unless I have been given specific instructions below for another diet, I accept this instruction as my diet prescription.   Other diet: regular     Special Instructions: None    · Is patient discharged on Warfarin / Coumadin?   No     Depression / Suicide Risk    As you are discharged from this [facility] facility, it is important to learn how to keep safe from harming yourself.    Recognize the warning signs:  · Abrupt changes in personality, positive or negative- including increase in energy   · Giving away possessions  · Change in eating patterns- significant weight changes-  positive or negative  · Change in sleeping patterns- unable to sleep or sleeping all the time   · Unwillingness or inability to communicate  · Depression  · Unusual sadness, discouragement and loneliness  · Talk of wanting to die  · Neglect of personal appearance   · Rebelliousness- reckless behavior  · Withdrawal from people/activities they love  · Confusion- inability to concentrate     If you or a loved one observes any of these behaviors or has concerns about self-harm, here's what you can do:  · Talk about it- your feelings and reasons for harming yourself  · Remove any means that you might use to hurt yourself (examples: pills, rope, extension cords, firearm)  · Get professional help from the community (Mental Health, Substance Abuse, psychological counseling)  · Do not be alone:Call your Safe Contact- someone whom you trust who will be there for you.  · Call your  local CRISIS HOTLINE 103-0903 or 215-669-2624  · Call your local Children's Mobile Crisis Response Team [place] (417) 130-2649 or www.WALTOP  · Call the toll free National Suicide Prevention Hotlines   · National Suicide Prevention Lifeline 782-618-CLQE (6266)  · National [employer] 24 Line Network 800-SUICIDE (567-2795)

## 2019-11-18 NOTE — PROGRESS NOTES
Report received from day RNCRISTIAN. Assumed care of patient at 1910. Pt sitting up in bed eating cheesecake during bedside report. Plan of care discussed and all questions answered. Fall precautions in place - bed is in low and locked position, call light/belongings within reach. Pt is A&Ox4, on room air. RN/CNA extensions provided, pt appropriately demonstrates how to utilize the call light.

## 2019-11-18 NOTE — CARE PLAN
Problem: Pain Management  Goal: Pain level will decrease to patient's comfort goal  Outcome: PROGRESSING AS EXPECTED  Pt able to rest - eyes closed, unlabored breathing, calm.      Problem: Respiratory:  Goal: Respiratory status will improve  Outcome: PROGRESSING SLOWER THAN EXPECTED  Refer to flow sheet. Patient not tolerating room air when sleeping.

## 2019-11-18 NOTE — DISCHARGE SUMMARY
Internal Medicine Discharge Summary  Note Author: Joan Hawley M.D.       Name Travis Oliva     1955   Age/Sex 63 y.o. male   MRN [id number]         Admit Date:  11/15/2019       Discharge Date:   2019    Service:   [facility] Internal Medicine [facility] Team  Attending Physician(s):   Dr. Barksdale       Senior Resident(s):   Dr. Hawley   Matias Resident(s):   Dr. Genao  PCP: Jennifer Segundo M.D.      Primary Diagnosis:   Hydronephrosis     Secondary Diagnoses:                Active Problems:    Hydronephrosis POA: Yes    Hyperparathyroidism (HCC) POA: Yes    Hypertension POA: Yes    Sinus tachycardia POA: Unknown  Resolved Problems:    * No resolved hospital problems. *      Hospital Summary (Brief Narrative):       Mr. Oliva is a pleasant 63 year old male with past medical history benign prostatic hypertrophy and donald stone who recently underwent right ureteral stent placement status post removal on  who presented on  with right sided flank pain. On admission, he was afebrile without leukocytosis. Urinalysis showed large amount of occult blood with rare bacteria and positive leukocyte esterase without nitrites. He completed a 5 day course of ciprofloxacin. Imaging revealed moderate right hydronephrosis and perinephric fluid with stone debris in the right kidney and proximal right ureter.  Urology consulted and recommended no further surgical intervention and pain control. Patient was started on tamsulosin and treated supportively with pain medications. His pain symptoms improved and renal function remained stable. It was found that patient had borderline elevated Calcium thus parathyroid hormone was checked and also evaluating concerning for hyperparathyroidism which may be the cause of multiple renal stones. Ultrasound of thyroid/parathyroid gland was done and not suggestive of adenoma however it could not be exclusively ruled out. Patient will be discharged home with  urology follow up.      Patient /Hospital Summary (Details -- Problem Oriented) :          Hydronephrosis  Assessment & Plan  Right flank pain on admission, patient has history of nephrolithiasis with recent lithotripsy, stent placement and stent removal which was done on 11/13 and was discharged on 5-day course of ciprofloxacin.  CT scan showed moderate right hydronephrosis with amorphic stone debris seen in the right kidney and scattered in the proximal and midportion of the right ureter.  uranalysis was positive for occult blood, positive leukocyte esterase   SIRS 1/4 due to tachycardia but no leukocytosis, tachypnea, fever,no signs of infection   Making adequate urine  Urology was consulted who recommended pain control without other surgical intervention and recommended outpatient follow up    - Pain now well controlled, on flomax  - 5 day course of cipro completed     Hyperparathyroidism (HCC)  Assessment & Plan  Patient with history of multiple renal stones and Calcium at the upper limits of normal   - PTH elevated at 117 suggesting hyperparathyroidism   - US done and showed no obvious parathyroid adenoma  - pt will need outpatient follow up with PCP regarding further evaluation and treatment for possible primary hyperparathyroidism, consider sestamibi scan for further evaluation for adenoma       Sinus tachycardia  Assessment & Plan  Suspect this was due to pain, now resolved   Rehydrated.    Hypertension  Assessment & Plan  Patient has history of high blood pressure but he is not on any medication  Blood pressure fluctuating which may be due to pain   - recommend outpatient follow up with PCP and initiation of antihypertensive medications if needed      Consultants:     Urology     Procedures:        NA    Imaging/ Testing:      US-SOFT TISSUES OF HEAD - NECK   Final Result      1.  Normal-sized thyroid containing 2 incidental subcentimeter nodule within the right lobe      2.  No parathyroid gland or  adenoma identified. Parathyroid adenoma not excluded. The test of choice for detection of parathyroid adenoma is nuclear medicine sestamibi scan assuming the diagnosis of primary hyperparathyroidism is confirmed clinically      CT-RENAL COLIC EVALUATION(A/P W/O)   Final Result         1.  Moderate right hydronephrosis and perinephric fluid with amorphous stone debris in the right kidney and scattered in the proximal and midportion of the right ureter.      2.  Minimal calcified stone debris in the dependent portion of the bladder.      3.  Residual air in the bladder and in the right renal pelvis consistent with recent instrumentation.      4.  Normal appearance of the left kidney and left ureter.      5.  No evidence of bowel obstruction.            Discharge Medications:         Medication Reconciliation: Completed       Medication List      START taking these medications      Instructions   ketorolac 10 MG Tabs  Commonly known as:  TORADOL   Take 1 Tab by mouth every 6 hours as needed for Moderate Pain for up to 5 days. Use if needed.  Dose:  10 mg     omeprazole 20 MG delayed-release capsule  Start taking on:  November 19, 2019  Commonly known as:  PRILOSEC   Take 1 Cap by mouth every day for 5 days.  Dose:  20 mg     tamsulosin 0.4 MG capsule  Commonly known as:  FLOMAX   Take 1 Cap by mouth ONE-HALF HOUR AFTER BREAKFAST.  Dose:  0.4 mg        STOP taking these medications    ciprofloxacin 500 MG Tabs  Commonly known as:  CIPRO            Disposition:   Discharge home      Diet:   Regular Diet     Activity:   As tolerated     Instructions:      Medications as directed   Stay hydrated, should be drinking 8 (8oz) glasses per day   Follow up with Urology as scheduled   The patient was instructed to return to the ER in the event of worsening symptoms. I have counseled the patient on the importance of compliance and the patient has agreed to proceed with all medical recommendations and follow up plan indicated  above.   The patient understands that all medications come with benefits and risks. Risks may include permanent injury or death and these risks can be minimized with close reassessment and monitoring.        Primary Care Provider:    Jennifer Segundo M.D.  Discharge summary faxed to primary care provider:  Deferred  Copy of discharge summary given to the patient: Deferred      Follow up appointment details :      No future appointments.  Jennifer Segundo M.D.  [address]  [address]  [facility] [address]  [phone]      Please call or walk in to be seen by your primary care provider to check your Hyperparathyroidism and to be seen for a hospital follow up appointment. Thank you.         Pending Studies:        NA    Time spent on discharge day patient visit, preparing discharge paperwork and arranging for patient follow up.    Summary of follow up issues:   Follow up with urology as scheduled for repeat evaluation of ureteral obstruction/stricture   High normal Ca and elevated PTH with hx of multiple renal stones suggestive of possible primary hyperparathyroidism, this will need to be further evaluated as outpatient     Discharge Time (Minutes) :    60 mi n  Hospital Course Type:  Inpatient Stay >2 midnights      Condition on Discharge    ______________________________________________________________________    Interval history/exam for day of discharge:       Patient doing well this AM, states pain is better controlled. Having good urine outpatient. He is eager to go home. Vitals stable     Physical Exam   Constitutional: He is oriented to person, place, and time.   HENT:   Head: Normocephalic and atraumatic.   Eyes: Pupils are equal, round, and reactive to light.   Neck: Normal range of motion. Neck supple.   Cardiovascular: Normal rate and regular rhythm.   Pulmonary/Chest: Effort normal and breath sounds normal.   Abdominal: Soft. Bowel sounds are normal.   No CVA tenderness   Musculoskeletal: Normal range of motion.          General: No edema.   Neurological: He is alert and oriented to person, place, and time.   Skin: Skin is warm and dry.       Most Recent Labs:    Lab Results   Component Value Date/Time    WBC 6.6 11/17/2019 03:04 AM    RBC 4.63 (L) 11/17/2019 03:04 AM    HEMOGLOBIN 13.1 (L) 11/17/2019 03:04 AM    HEMATOCRIT 40.0 (L) 11/17/2019 03:04 AM    MCV 86.4 11/17/2019 03:04 AM    MCH 28.3 11/17/2019 03:04 AM    MCHC 32.8 (L) 11/17/2019 03:04 AM    MPV 9.0 11/17/2019 03:04 AM    NEUTSPOLYS 62.00 11/17/2019 03:04 AM    LYMPHOCYTES 16.90 (L) 11/17/2019 03:04 AM    MONOCYTES 16.00 (H) 11/17/2019 03:04 AM    EOSINOPHILS 4.10 11/17/2019 03:04 AM    BASOPHILS 0.50 11/17/2019 03:04 AM      Lab Results   Component Value Date/Time    SODIUM 139 11/18/2019 12:57 AM    POTASSIUM 3.5 (L) 11/18/2019 12:57 AM    CHLORIDE 106 11/18/2019 12:57 AM    CO2 24 11/18/2019 12:57 AM    GLUCOSE 102 (H) 11/18/2019 12:57 AM    BUN 14 11/18/2019 12:57 AM    CREATININE 0.89 11/18/2019 12:57 AM      Lab Results   Component Value Date/Time    ALTSGPT 45 11/16/2019 05:12 AM    ASTSGOT 43 11/16/2019 05:12 AM    ALKPHOSPHAT 103 (H) 11/16/2019 05:12 AM    TBILIRUBIN 0.9 11/16/2019 05:12 AM    ALBUMIN 3.0 (L) 11/16/2019 05:12 AM    GLOBULIN 3.0 11/16/2019 05:12 AM     No results found for: PROTHROMBTM, INR

## 2019-11-18 NOTE — DISCHARGE PLANNING
Anticipated Discharge Disposition: Home    Action: DC order placed. LSW discussed discharge with charge RN. Pt required O2 over night. Bedside RN to complete Home O2 test to see if pt required O2 at home.     Barriers to Discharge: None    Plan: No additional discharge needs at this time.

## 2019-11-19 NOTE — PROGRESS NOTES
Internal Medicine Interval Note  Note Author: Neri Genao M.D.     Name Travis Oliva 1955   Age/Sex 63 y.o. male   MRN [id number]   Code Status Full code      After 5PM or if no immediate response to page, please call for cross-coverage  Attending/Team: Dr. Castanon/ Cody See Patient List for primary contact information  Call [phone] to page    1st Call - Day Intern (R1):   Chadwick  2nd Call - Day Sr. Resident (R2/R3):   Chandan         Reason for interval visit  (Principal Problem)   Right flank pain     Interval Problem Daily Status Update  (24 hours, problem oriented, brief subjective history, new lab/imaging data pertinent to that problem)     - Overnight events: No overnight events.  - Subjective: Right flank pain resolved today.  - Vitals: HR around <100. No SIRS  - Pertinent physical: Right flank tenderness with palpation.  - Labs/Cultures: K 3.5, replaced with 40mEq Kdur.  - Imaging: No imaging today.  - Consults: Urology signed of yesterday. Patient Ok for discharge per uro.  - Interventions: No interventions  - Med changes: No changes.  - Daytime events No events.  - Discharge plan: To home with urology and PCP follow up (for hyperparathyroidism)       =====================================         Review of Systems   Constitutional: Negative for chills and fever.   HENT: Negative for congestion.    Eyes: Negative for blurred vision.   Respiratory: Negative for cough and shortness of breath.    Cardiovascular: Negative for chest pain, palpitations and leg swelling.   Gastrointestinal: Negative for abdominal pain, diarrhea, nausea and vomiting.   Genitourinary: Positive for flank pain. Negative for dysuria, frequency and urgency.   Musculoskeletal: Positive for back pain.   Neurological: Negative for dizziness, focal weakness and headaches.   Psychiatric/Behavioral: Negative for depression.       Disposition/Barriers to discharge:   To  home    Consultants/Specialty  Urology   PCP: Jennifer Segundo M.D.      Quality Measures  Quality-Core Measures   Reviewed items::  Labs reviewed and Medications reviewed  Faulkner catheter::  No Faulkner  DVT prophylaxis pharmacological::  Enoxaparin (Lovenox)          Physical Exam       Vitals:    11/18/19 0350 11/18/19 0720 11/18/19 1000 11/18/19 1018   BP: 147/88 131/94     Pulse: 85 85     Resp: 16 16     Temp: 36.9 °C (98.4 °F) 36.8 °C (98.3 °F)     TempSrc: Temporal Temporal     SpO2: 97% 95% 88% 94%   Weight:       Height:         Body mass index is 24.08 kg/m².    Oxygen Therapy:  Pulse Oximetry: 94 %, O2 (LPM): 1, O2 Delivery: None (Room Air)    Physical Exam   Constitutional: He is oriented to person, place, and time and well-developed, well-nourished, and in no distress.   HENT:   Head: Normocephalic and atraumatic.   Eyes: Pupils are equal, round, and reactive to light. Conjunctivae and EOM are normal.   Neck: Normal range of motion.   Cardiovascular: Normal rate, regular rhythm and normal heart sounds.   No murmur heard.  Pulmonary/Chest: Effort normal and breath sounds normal. No respiratory distress. He has no wheezes.   Abdominal: Soft. Bowel sounds are normal. He exhibits no distension. There is no tenderness.   No CVA tenderness    Musculoskeletal: Normal range of motion.         General: No edema.   Neurological: He is alert and oriented to person, place, and time. No cranial nerve deficit.   Skin: Skin is warm. No erythema.   Psychiatric: Affect normal.   Nursing note and vitals reviewed.        Assessment/Plan     Problem List as of 11/18/2019 Reviewed: 11/13/2019 11:51 AM by Deisy Quiros M.D.    Hydronephrosis    Last Assessment & Plan 11/15/2019 Hospital Encounter Edited 11/18/2019 12:10 PM by Joan Hawley M.D.     Right flank pain on admission, patient has history of nephrolithiasis with recent lithotripsy, stent placement and stent removal which was done on 11/13 and was discharged on  5-day course of ciprofloxacin.  CT scan showed moderate right hydronephrosis with amorphic stone debris seen in the right kidney and scattered in the proximal and midportion of the right ureter.  uranalysis was positive for occult blood, positive leukocyte esterase   SIRS 1/4 due to tachycardia but no leukocytosis, tachypnea, fever,no signs of infection   Making adequate urine  Urology was consulted who recommended pain control without other surgical intervention and recommended outpatient follow up    - Pain now well controlled, on flomax  - 5 day course of cipro completed          Hyperparathyroidism (HCC)    Last Assessment & Plan 11/15/2019 Hospital Encounter Written 11/18/2019 12:17 PM by Joan Hawley M.D.     Patient with history of multiple renal stones and Calcium at the upper limits of normal   - PTH elevated at 117 suggesting hyperparathyroidism   - US done and showed no obvious parathyroid adenoma  - pt will need outpatient follow up with PCP regarding further evaluation and treatment for possible primary hyperparathyroidism, consider sestamibi scan for further evaluation for adenoma            Hypertension    Last Assessment & Plan 11/15/2019 Hospital Encounter Edited 11/18/2019 12:10 PM by Joan Hawley M.D.     Patient has history of high blood pressure but he is not on any medication  Blood pressure fluctuating which may be due to pain   - recommend outpatient follow up with PCP and initiation of antihypertensive medications if needed         Sinus tachycardia    Last Assessment & Plan 11/15/2019 Hospital Encounter Edited 11/18/2019 12:11 PM by Joan Hawley M.D.     Suspect this was due to pain, now resolved   Rehydrated.

## 2020-12-24 ENCOUNTER — HOSPITAL ENCOUNTER (INPATIENT)
Facility: MEDICAL CENTER | Age: 65
LOS: 3 days | DRG: 871 | End: 2020-12-27
Attending: EMERGENCY MEDICINE | Admitting: STUDENT IN AN ORGANIZED HEALTH CARE EDUCATION/TRAINING PROGRAM
Payer: MEDICARE

## 2020-12-24 ENCOUNTER — APPOINTMENT (OUTPATIENT)
Dept: RADIOLOGY | Facility: MEDICAL CENTER | Age: 65
DRG: 871 | End: 2020-12-24
Attending: EMERGENCY MEDICINE
Payer: MEDICARE

## 2020-12-24 DIAGNOSIS — J96.01 ACUTE RESPIRATORY FAILURE WITH HYPOXIA (HCC): ICD-10-CM

## 2020-12-24 DIAGNOSIS — U07.1 COVID-19: ICD-10-CM

## 2020-12-24 DIAGNOSIS — R40.0 SOMNOLENCE: ICD-10-CM

## 2020-12-24 PROBLEM — R33.9 URINARY RETENTION: Status: ACTIVE | Noted: 2020-12-24

## 2020-12-24 PROBLEM — J12.82 PNEUMONIA DUE TO COVID-19 VIRUS: Status: ACTIVE | Noted: 2020-12-24

## 2020-12-24 LAB
ALBUMIN SERPL BCP-MCNC: 3.1 G/DL (ref 3.2–4.9)
ALBUMIN/GLOB SERPL: 0.8 G/DL
ALP SERPL-CCNC: 86 U/L (ref 30–99)
ALT SERPL-CCNC: 21 U/L (ref 2–50)
ANION GAP SERPL CALC-SCNC: 12 MMOL/L (ref 7–16)
APPEARANCE UR: ABNORMAL
AST SERPL-CCNC: 28 U/L (ref 12–45)
BACTERIA #/AREA URNS HPF: NEGATIVE /HPF
BASOPHILS # BLD AUTO: 0.3 % (ref 0–1.8)
BASOPHILS # BLD: 0.03 K/UL (ref 0–0.12)
BILIRUB SERPL-MCNC: 0.8 MG/DL (ref 0.1–1.5)
BILIRUB UR QL STRIP.AUTO: NEGATIVE
BLOOD CULTURE HOLD CXBCH: NORMAL
BUN SERPL-MCNC: 32 MG/DL (ref 8–22)
CALCIUM SERPL-MCNC: 9.3 MG/DL (ref 8.5–10.5)
CHLORIDE SERPL-SCNC: 102 MMOL/L (ref 96–112)
CO2 SERPL-SCNC: 23 MMOL/L (ref 20–33)
COLOR UR: YELLOW
COVID ORDER STATUS COVID19: NORMAL
CREAT SERPL-MCNC: 1.07 MG/DL (ref 0.5–1.4)
CRP SERPL HS-MCNC: 13.39 MG/DL (ref 0–0.75)
EOSINOPHIL # BLD AUTO: 0 K/UL (ref 0–0.51)
EOSINOPHIL NFR BLD: 0 % (ref 0–6.9)
EPI CELLS #/AREA URNS HPF: ABNORMAL /HPF
ERYTHROCYTE [DISTWIDTH] IN BLOOD BY AUTOMATED COUNT: 45.5 FL (ref 35.9–50)
FLUAV RNA SPEC QL NAA+PROBE: NEGATIVE
FLUBV RNA SPEC QL NAA+PROBE: NEGATIVE
GLOBULIN SER CALC-MCNC: 3.8 G/DL (ref 1.9–3.5)
GLUCOSE SERPL-MCNC: 117 MG/DL (ref 65–99)
GLUCOSE UR STRIP.AUTO-MCNC: NEGATIVE MG/DL
HCT VFR BLD AUTO: 52.3 % (ref 42–52)
HGB BLD-MCNC: 17.6 G/DL (ref 14–18)
HYALINE CASTS #/AREA URNS LPF: ABNORMAL /LPF
IMM GRANULOCYTES # BLD AUTO: 0.07 K/UL (ref 0–0.11)
IMM GRANULOCYTES NFR BLD AUTO: 0.7 % (ref 0–0.9)
KETONES UR STRIP.AUTO-MCNC: ABNORMAL MG/DL
LACTATE BLD-SCNC: 1.7 MMOL/L (ref 0.5–2)
LACTATE BLD-SCNC: 2.6 MMOL/L (ref 0.5–2)
LEUKOCYTE ESTERASE UR QL STRIP.AUTO: NEGATIVE
LYMPHOCYTES # BLD AUTO: 0.42 K/UL (ref 1–4.8)
LYMPHOCYTES NFR BLD: 4.2 % (ref 22–41)
MAGNESIUM SERPL-MCNC: 2.3 MG/DL (ref 1.5–2.5)
MCH RBC QN AUTO: 28.4 PG (ref 27–33)
MCHC RBC AUTO-ENTMCNC: 33.7 G/DL (ref 33.7–35.3)
MCV RBC AUTO: 84.5 FL (ref 81.4–97.8)
MICRO URNS: ABNORMAL
MONOCYTES # BLD AUTO: 0.76 K/UL (ref 0–0.85)
MONOCYTES NFR BLD AUTO: 7.6 % (ref 0–13.4)
NEUTROPHILS # BLD AUTO: 8.67 K/UL (ref 1.82–7.42)
NEUTROPHILS NFR BLD: 87.2 % (ref 44–72)
NITRITE UR QL STRIP.AUTO: NEGATIVE
NRBC # BLD AUTO: 0 K/UL
NRBC BLD-RTO: 0 /100 WBC
NT-PROBNP SERPL IA-MCNC: 106 PG/ML (ref 0–125)
PH UR STRIP.AUTO: 8 [PH] (ref 5–8)
PHOSPHATE SERPL-MCNC: 3.7 MG/DL (ref 2.5–4.5)
PLATELET # BLD AUTO: 204 K/UL (ref 164–446)
PMV BLD AUTO: 10.1 FL (ref 9–12.9)
POTASSIUM SERPL-SCNC: 4.1 MMOL/L (ref 3.6–5.5)
PROCALCITONIN SERPL-MCNC: 1.02 NG/ML
PROT SERPL-MCNC: 6.9 G/DL (ref 6–8.2)
PROT UR QL STRIP: 30 MG/DL
RBC # BLD AUTO: 6.19 M/UL (ref 4.7–6.1)
RBC # URNS HPF: ABNORMAL /HPF
RBC UR QL AUTO: NEGATIVE
RSV RNA SPEC QL NAA+PROBE: NEGATIVE
SARS-COV-2 RNA RESP QL NAA+PROBE: DETECTED
SODIUM SERPL-SCNC: 137 MMOL/L (ref 135–145)
SP GR UR STRIP.AUTO: 1.02
SPECIMEN SOURCE: ABNORMAL
TRANS CELLS #/AREA URNS HPF: ABNORMAL /HPF
TRI-PHOS CRY #/AREA URNS HPF: ABNORMAL /HPF
TROPONIN T SERPL-MCNC: 14 NG/L (ref 6–19)
TSH SERPL DL<=0.005 MIU/L-ACNC: 0.64 UIU/ML (ref 0.38–5.33)
UROBILINOGEN UR STRIP.AUTO-MCNC: 1 MG/DL
WBC # BLD AUTO: 10 K/UL (ref 4.8–10.8)
WBC #/AREA URNS HPF: ABNORMAL /HPF

## 2020-12-24 PROCEDURE — 86140 C-REACTIVE PROTEIN: CPT

## 2020-12-24 PROCEDURE — 80053 COMPREHEN METABOLIC PANEL: CPT

## 2020-12-24 PROCEDURE — 96365 THER/PROPH/DIAG IV INF INIT: CPT

## 2020-12-24 PROCEDURE — 99285 EMERGENCY DEPT VISIT HI MDM: CPT

## 2020-12-24 PROCEDURE — 700102 HCHG RX REV CODE 250 W/ 637 OVERRIDE(OP): Performed by: STUDENT IN AN ORGANIZED HEALTH CARE EDUCATION/TRAINING PROGRAM

## 2020-12-24 PROCEDURE — 81001 URINALYSIS AUTO W/SCOPE: CPT

## 2020-12-24 PROCEDURE — 84443 ASSAY THYROID STIM HORMONE: CPT

## 2020-12-24 PROCEDURE — A9270 NON-COVERED ITEM OR SERVICE: HCPCS | Performed by: STUDENT IN AN ORGANIZED HEALTH CARE EDUCATION/TRAINING PROGRAM

## 2020-12-24 PROCEDURE — 700105 HCHG RX REV CODE 258: Performed by: EMERGENCY MEDICINE

## 2020-12-24 PROCEDURE — 0241U HCHG SARS-COV-2 COVID-19 NFCT DS RESP RNA 4 TRGT MIC: CPT

## 2020-12-24 PROCEDURE — 99221 1ST HOSP IP/OBS SF/LOW 40: CPT | Mod: AI | Performed by: STUDENT IN AN ORGANIZED HEALTH CARE EDUCATION/TRAINING PROGRAM

## 2020-12-24 PROCEDURE — 770020 HCHG ROOM/CARE - TELE (206)

## 2020-12-24 PROCEDURE — 36415 COLL VENOUS BLD VENIPUNCTURE: CPT

## 2020-12-24 PROCEDURE — 93005 ELECTROCARDIOGRAM TRACING: CPT | Performed by: STUDENT IN AN ORGANIZED HEALTH CARE EDUCATION/TRAINING PROGRAM

## 2020-12-24 PROCEDURE — 83880 ASSAY OF NATRIURETIC PEPTIDE: CPT

## 2020-12-24 PROCEDURE — C9803 HOPD COVID-19 SPEC COLLECT: HCPCS | Performed by: EMERGENCY MEDICINE

## 2020-12-24 PROCEDURE — 85025 COMPLETE CBC W/AUTO DIFF WBC: CPT

## 2020-12-24 PROCEDURE — 84145 PROCALCITONIN (PCT): CPT

## 2020-12-24 PROCEDURE — 84100 ASSAY OF PHOSPHORUS: CPT

## 2020-12-24 PROCEDURE — 83735 ASSAY OF MAGNESIUM: CPT

## 2020-12-24 PROCEDURE — 83605 ASSAY OF LACTIC ACID: CPT

## 2020-12-24 PROCEDURE — 84484 ASSAY OF TROPONIN QUANT: CPT

## 2020-12-24 PROCEDURE — 87086 URINE CULTURE/COLONY COUNT: CPT

## 2020-12-24 PROCEDURE — 71045 X-RAY EXAM CHEST 1 VIEW: CPT

## 2020-12-24 PROCEDURE — 700111 HCHG RX REV CODE 636 W/ 250 OVERRIDE (IP): Performed by: EMERGENCY MEDICINE

## 2020-12-24 RX ORDER — ONDANSETRON 2 MG/ML
4 INJECTION INTRAMUSCULAR; INTRAVENOUS EVERY 4 HOURS PRN
Status: DISCONTINUED | OUTPATIENT
Start: 2020-12-24 | End: 2020-12-27 | Stop reason: HOSPADM

## 2020-12-24 RX ORDER — FUROSEMIDE 40 MG/1
20 TABLET ORAL
Status: DISCONTINUED | OUTPATIENT
Start: 2020-12-25 | End: 2020-12-24

## 2020-12-24 RX ORDER — POLYETHYLENE GLYCOL 3350 17 G/17G
1 POWDER, FOR SOLUTION ORAL
Status: DISCONTINUED | OUTPATIENT
Start: 2020-12-24 | End: 2020-12-27 | Stop reason: HOSPADM

## 2020-12-24 RX ORDER — LABETALOL HYDROCHLORIDE 5 MG/ML
10 INJECTION, SOLUTION INTRAVENOUS EVERY 4 HOURS PRN
Status: DISCONTINUED | OUTPATIENT
Start: 2020-12-24 | End: 2020-12-27 | Stop reason: HOSPADM

## 2020-12-24 RX ORDER — DEXAMETHASONE 4 MG/1
6 TABLET ORAL DAILY
Status: DISCONTINUED | OUTPATIENT
Start: 2020-12-24 | End: 2020-12-27 | Stop reason: HOSPADM

## 2020-12-24 RX ORDER — ACETAMINOPHEN 500 MG
1000 TABLET ORAL EVERY 6 HOURS PRN
COMMUNITY

## 2020-12-24 RX ORDER — SODIUM CHLORIDE 9 MG/ML
1000 INJECTION, SOLUTION INTRAVENOUS ONCE
Status: COMPLETED | OUTPATIENT
Start: 2020-12-24 | End: 2020-12-24

## 2020-12-24 RX ORDER — NITROFURANTOIN 25; 75 MG/1; MG/1
CAPSULE ORAL
COMMUNITY
End: 2020-12-24

## 2020-12-24 RX ORDER — ONDANSETRON 4 MG/1
4 TABLET, ORALLY DISINTEGRATING ORAL EVERY 4 HOURS PRN
Status: DISCONTINUED | OUTPATIENT
Start: 2020-12-24 | End: 2020-12-27 | Stop reason: HOSPADM

## 2020-12-24 RX ORDER — ACETAMINOPHEN 500 MG
1000 TABLET ORAL 3 TIMES DAILY
Status: DISCONTINUED | OUTPATIENT
Start: 2020-12-24 | End: 2020-12-26

## 2020-12-24 RX ORDER — BISACODYL 10 MG
10 SUPPOSITORY, RECTAL RECTAL
Status: DISCONTINUED | OUTPATIENT
Start: 2020-12-24 | End: 2020-12-27 | Stop reason: HOSPADM

## 2020-12-24 RX ORDER — AMOXICILLIN 250 MG
2 CAPSULE ORAL 2 TIMES DAILY
Status: DISCONTINUED | OUTPATIENT
Start: 2020-12-24 | End: 2020-12-27 | Stop reason: HOSPADM

## 2020-12-24 RX ORDER — SULFAMETHOXAZOLE AND TRIMETHOPRIM 800; 160 MG/1; MG/1
TABLET ORAL
COMMUNITY
End: 2020-12-24

## 2020-12-24 RX ORDER — TAMSULOSIN HYDROCHLORIDE 0.4 MG/1
0.4 CAPSULE ORAL
Status: DISCONTINUED | OUTPATIENT
Start: 2020-12-25 | End: 2020-12-27 | Stop reason: HOSPADM

## 2020-12-24 RX ADMIN — ACETAMINOPHEN 1000 MG: 500 TABLET ORAL at 19:30

## 2020-12-24 RX ADMIN — DOCUSATE SODIUM 50 MG AND SENNOSIDES 8.6 MG 2 TABLET: 8.6; 5 TABLET, FILM COATED ORAL at 19:31

## 2020-12-24 RX ADMIN — DEXAMETHASONE 6 MG: 4 TABLET ORAL at 19:31

## 2020-12-24 RX ADMIN — SODIUM CHLORIDE 1000 ML: 9 INJECTION, SOLUTION INTRAVENOUS at 15:12

## 2020-12-24 RX ADMIN — CEFTRIAXONE SODIUM 2 G: 2 INJECTION, POWDER, FOR SOLUTION INTRAMUSCULAR; INTRAVENOUS at 15:12

## 2020-12-24 ASSESSMENT — FIBROSIS 4 INDEX
FIB4 SCORE: 2.64
FIB4 SCORE: 1.98
FIB4 SCORE: 2.64

## 2020-12-24 ASSESSMENT — COGNITIVE AND FUNCTIONAL STATUS - GENERAL
SUGGESTED CMS G CODE MODIFIER MOBILITY: CK
DRESSING REGULAR LOWER BODY CLOTHING: A LITTLE
PERSONAL GROOMING: A LITTLE
STANDING UP FROM CHAIR USING ARMS: A LITTLE
TOILETING: A LITTLE
MOBILITY SCORE: 19
DAILY ACTIVITIY SCORE: 19
CLIMB 3 TO 5 STEPS WITH RAILING: A LITTLE
MOVING TO AND FROM BED TO CHAIR: A LITTLE
WALKING IN HOSPITAL ROOM: A LITTLE
SUGGESTED CMS G CODE MODIFIER DAILY ACTIVITY: CK
HELP NEEDED FOR BATHING: A LITTLE
DRESSING REGULAR UPPER BODY CLOTHING: A LITTLE
MOVING FROM LYING ON BACK TO SITTING ON SIDE OF FLAT BED: A LITTLE

## 2020-12-24 NOTE — ED NOTES
Med rec complete via interview with pt at bedside. Pt denies taking any prescription medications. Allergies reviewed. Pt denies antibiotic use in past 14 days.

## 2020-12-24 NOTE — ED NOTES
All labs have been sent including urine and covid swab. Will repeat lactic when fluids are finished. Pt lactic acid is at 2.6

## 2020-12-24 NOTE — ED PROVIDER NOTES
"ED Provider Note    CHIEF COMPLAINT  Chief Complaint   Patient presents with   • Weakness   • Failure to Thrive       HPI  Travis Macario Oliva is a 65 y.o. male who presents for evaluation of generalized weakness with malaise and fatigue for the past 2 weeks or so, has a burning with urination as well.  Patient reports some back pain \" my kidneys hurt\" associated with this.  He is brought in by ambulance, found to be febrile by EMS and was treated with Tylenol.  Patient denies any abdominal pain.  He denies weakness and numbness and tingling, no chest pain, no shortness of breath, no coughing.  He is a very poor historian and he seems confused and I think this limits the history.      REVIEW OF SYSTEMS  Negative for rash, chest pain, dyspnea, abdominal pain, nausea, vomiting, diarrhea, headache, focal weakness, focal numbness, focal tingling. All other systems are negative.     PAST MEDICAL HISTORY  Past Medical History:   Diagnosis Date   • Bladder stones 05/25/2018   • Cataract 05/25/2018     Cataract OS, IOL OD   • Dental disorder     Full Set Dentures   • Heart burn 05/25/2018   • Hypertension 05/25/2018    Not on medication for at this time.   • Urinary bladder disorder 05/25/2018    Bladder stones       FAMILY HISTORY  No family history on file.    SOCIAL HISTORY  Social History     Tobacco Use   • Smoking status: Current Every Day Smoker     Packs/day: 1.00     Years: 20.00     Pack years: 20.00     Types: Cigarettes   • Smokeless tobacco: Never Used   Substance Use Topics   • Alcohol use: No   • Drug use: No       SURGICAL HISTORY  Past Surgical History:   Procedure Laterality Date   • URETEROSCOPY N/A 11/13/2019    Procedure: URETEROSCOPY;  Surgeon: Johnny Dobbins M.D.;  Location: [facility];  Service: Urology   • LASERTRIPSY Right 11/13/2019    Procedure: LITHOTRIPSY, USING LASER;  Surgeon: Johnny Dobbins M.D.;  Location: [facility];  Service: Urology   • CYSTOSCOPY  " "11/13/2019    Procedure: CYSTOSCOPY;  Surgeon: Johnny Dobbins M.D.;  Location: SUR[facility];  Service: Urology   • BLADDER BIOPSY WITH CYSTOSCOPY  6/1/2018    Procedure: BLADDER BIOPSY WITH CYSTOSCOPY - CYSTOLITHOLAPAXY, POSS BLADDER BIOPSY, URETHROGRAM;  Surgeon: Jeovany Demarco M.D.;  Location: SURGERY [facility];  Service: Urology   • RETROGRADES  6/1/2018    Procedure: RETROGRADES - POSS;  Surgeon: Jeovany Demarco M.D.;  Location: SURGERY [facility];  Service: Urology   • LASERTRIPSY  6/1/2018    Procedure: LASERTRIPSY;  Surgeon: Jeovany Demarco M.D.;  Location: SURGERY [facility];  Service: Urology   • BLADDER BIOPSY WITH CYSTOSCOPY  02/2018   • HERNIA REPAIR         CURRENT MEDICATIONS  I personally reviewed the medication list in the charting documentation.     ALLERGIES  No Known Allergies    MEDICAL RECORD  I have reviewed patient's medical record and pertinent results are listed above.      PHYSICAL EXAM  VITAL SIGNS: /79   Pulse (!) 123   Temp 37.8 °C (100.1 °F) (Temporal)   Resp (!) 25   Ht 1.753 m (5' 9\")   Wt 77.1 kg (170 lb)   SpO2 95% on 4 L nasal cannula  BMI 25.10 kg/m²    Constitutional: Ill-appearing, somnolent, arousable, confused  HENT: Normocephalic, no evidence of acute trauma.  Eyes: No scleral icterus. Normal conjunctiva   Neck: Supple, comfortable, nonpainful range of motion.   Cardiovascular: Tachycardic, regular.   Thorax & Lungs: Chest is nontender.  Mild scattered crackles, no wheezing.  Abdomen: Soft, with no tenderness, rebound nor guarding.  No mass or pulsatile mass appreciated.  Skin: Warm, dry. No rash appreciated  Extremities/Musculoskeletal: No sign of trauma. No asymmetric calf tenderness, erythema or edema. Normal range of motion   Neurologic: Somnolent, arousable to verbal stimulation, awake and alert now, slurred speech and disorientation, moves all 4 extremities with symmetric strength.      DIAGNOSTIC STUDIES / " PROCEDURES    LABS/EKGs  Results for orders placed or performed during the hospital encounter of 12/24/20   CBC WITH DIFFERENTIAL   Result Value Ref Range    WBC 10.0 4.8 - 10.8 K/uL    RBC 6.19 (H) 4.70 - 6.10 M/uL    Hemoglobin 17.6 14.0 - 18.0 g/dL    Hematocrit 52.3 (H) 42.0 - 52.0 %    MCV 84.5 81.4 - 97.8 fL    MCH 28.4 27.0 - 33.0 pg    MCHC 33.7 33.7 - 35.3 g/dL    RDW 45.5 35.9 - 50.0 fL    Platelet Count 204 164 - 446 K/uL    MPV 10.1 9.0 - 12.9 fL    Neutrophils-Polys 87.20 (H) 44.00 - 72.00 %    Lymphocytes 4.20 (L) 22.00 - 41.00 %    Monocytes 7.60 0.00 - 13.40 %    Eosinophils 0.00 0.00 - 6.90 %    Basophils 0.30 0.00 - 1.80 %    Immature Granulocytes 0.70 0.00 - 0.90 %    Nucleated RBC 0.00 /100 WBC    Neutrophils (Absolute) 8.67 (H) 1.82 - 7.42 K/uL    Lymphs (Absolute) 0.42 (L) 1.00 - 4.80 K/uL    Monos (Absolute) 0.76 0.00 - 0.85 K/uL    Eos (Absolute) 0.00 0.00 - 0.51 K/uL    Baso (Absolute) 0.03 0.00 - 0.12 K/uL    Immature Granulocytes (abs) 0.07 0.00 - 0.11 K/uL    NRBC (Absolute) 0.00 K/uL   COMP METABOLIC PANEL   Result Value Ref Range    Sodium 137 135 - 145 mmol/L    Potassium 4.1 3.6 - 5.5 mmol/L    Chloride 102 96 - 112 mmol/L    Co2 23 20 - 33 mmol/L    Anion Gap 12.0 7.0 - 16.0    Glucose 117 (H) 65 - 99 mg/dL    Bun 32 (H) 8 - 22 mg/dL    Creatinine 1.07 0.50 - 1.40 mg/dL    Calcium 9.3 8.5 - 10.5 mg/dL    AST(SGOT) 28 12 - 45 U/L    ALT(SGPT) 21 2 - 50 U/L    Alkaline Phosphatase 86 30 - 99 U/L    Total Bilirubin 0.8 0.1 - 1.5 mg/dL    Albumin 3.1 (L) 3.2 - 4.9 g/dL    Total Protein 6.9 6.0 - 8.2 g/dL    Globulin 3.8 (H) 1.9 - 3.5 g/dL    A-G Ratio 0.8 g/dL   LACTIC ACID   Result Value Ref Range    Lactic Acid 2.6 (H) 0.5 - 2.0 mmol/L   TROPONIN   Result Value Ref Range    Troponin T 14 6 - 19 ng/L   URINALYSIS    Specimen: Urine   Result Value Ref Range    Color Yellow     Character Cloudy (A)     Specific Gravity 1.019 <1.035    Ph 8.0 5.0 - 8.0    Glucose Negative Negative mg/dL     Ketones Trace (A) Negative mg/dL    Protein 30 (A) Negative mg/dL    Bilirubin Negative Negative    Urobilinogen, Urine 1.0 Negative    Nitrite Negative Negative    Leukocyte Esterase Negative Negative    Occult Blood Negative Negative    Micro Urine Req Microscopic    COVID/SARS CoV-2 PCR    Specimen: Nasopharyngeal; Respirate   Result Value Ref Range    COVID Order Status Received    ESTIMATED GFR   Result Value Ref Range    GFR If African American >60 >60 mL/min/1.73 m 2    GFR If Non African American >60 >60 mL/min/1.73 m 2   URINE MICROSCOPIC (W/UA)   Result Value Ref Range    WBC 2-5 (A) /hpf    RBC 0-2 (A) /hpf    Bacteria Negative None /hpf    Epithelial Cells Few /hpf    Trans Epithelial Cells Few /hpf    Triple Phos Crystal Few /hpf    Hyaline Cast 6-10 (A) /lpf   CoV-2, Flu A/B, And RSV by PCR   Result Value Ref Range    Influenza virus A RNA Negative Negative    Influenza virus B, PCR Negative Negative    RSV, PCR Negative Negative    SARS-CoV-2 by PCR DETECTED (AA)     SARS-CoV-2 Source NP Swab    Blood Culture,Hold   Result Value Ref Range    Blood Culture Hold Collected         RADIOLOGY  DX-CHEST-LIMITED (1 VIEW)   Final Result      Hypoinflation with patchy ill-defined opacities in both lungs suggesting multifocal pneumonia, possibly Covid 19 pneumonia.            COURSE & MEDICAL DECISION MAKING  I have reviewed any medical record information, laboratory studies and radiographic results as noted above.  Differential diagnoses includes: Sepsis, UTI, pyelonephritis, dehydration, lecture light abnormalities, anemia, pneumonia, Covid 19    Encounter Summary: This is a 65 y.o. male with 2 weeks of progressive generalized weakness with malaise and fatigue, has urinary discomfort associated with this, reports bilateral flank pain as well.  He presents tachycardic and tachypneic, was found to be febrile by EMS, on exam he is ill-appearing.  I suspect this patient is septic from a likely urinary source. We  will treat presumptively with ceftriaxone.  Could also be COVID-19, he is on 4 L nasal cannula oxygen after being found hypoxic by EMS.  The regular septic work-up will be initiated, he will be watched quite closely here in the emergency department.  We will also swab him for COVID-19 ------- urinalysis does not indicate infection.  WBC is 10.  Covid is positive, the patient is hypoxic on room air with oxygen saturation of 85%, requires hospitalization for further evaluation and care    CRITICAL CARE  The very real possibilty of a deterioration of this patient's condition required the highest level of my preparedness for sudden, emergent intervention.  I provided critical care services, which included medication orders, frequent reevaluations of the patient's condition and response to treatment, ordering and reviewing test results, and discussing the case with various consultants.  The critical care time associated with the care of the patient was 39 minutes. Review chart for interventions. This time is exclusive of any other billable procedures.         DISPOSITION: Admit in guarded condition      FINAL IMPRESSION  1. COVID-19    2. Acute respiratory failure with hypoxia (HCC)    3. Somnolence           This dictation was created using voice recognition software. The accuracy of the dictation is limited to the abilities of the software. I expect there may be some errors of grammar and possibly content. The nursing notes were reviewed and certain aspects of this information were incorporated into this note.    Electronically signed by: Alverto Ellington M.D., 12/24/2020 2:41 PM

## 2020-12-24 NOTE — ED TRIAGE NOTES
Pt has not been feeling well the past 2 weeks. Pt has had painful urination for the past month that has not been treated. For the past 2-3 days pt is becoming weaker and decreased appetite with a cough. EMS gave 1000mg of tylenol and about 500ml of NS in the RAC 20G.

## 2020-12-25 ENCOUNTER — APPOINTMENT (OUTPATIENT)
Dept: RADIOLOGY | Facility: MEDICAL CENTER | Age: 65
DRG: 871 | End: 2020-12-25
Attending: STUDENT IN AN ORGANIZED HEALTH CARE EDUCATION/TRAINING PROGRAM
Payer: MEDICARE

## 2020-12-25 PROBLEM — A41.9 SEPSIS (HCC): Status: RESOLVED | Noted: 2020-12-25 | Resolved: 2020-12-25

## 2020-12-25 PROBLEM — A41.9 SEPSIS (HCC): Status: ACTIVE | Noted: 2020-12-25

## 2020-12-25 PROBLEM — Z87.442 HISTORY OF NEPHROLITHIASIS: Status: ACTIVE | Noted: 2020-12-25

## 2020-12-25 LAB
ALBUMIN SERPL BCP-MCNC: 3.1 G/DL (ref 3.2–4.9)
ALBUMIN/GLOB SERPL: 0.9 G/DL
ALP SERPL-CCNC: 85 U/L (ref 30–99)
ALT SERPL-CCNC: 18 U/L (ref 2–50)
ANION GAP SERPL CALC-SCNC: 8 MMOL/L (ref 7–16)
AST SERPL-CCNC: 30 U/L (ref 12–45)
BASOPHILS # BLD AUTO: 0.2 % (ref 0–1.8)
BASOPHILS # BLD: 0.03 K/UL (ref 0–0.12)
BILIRUB SERPL-MCNC: 0.5 MG/DL (ref 0.1–1.5)
BUN SERPL-MCNC: 44 MG/DL (ref 8–22)
CALCIUM SERPL-MCNC: 9.6 MG/DL (ref 8.5–10.5)
CHLORIDE SERPL-SCNC: 103 MMOL/L (ref 96–112)
CO2 SERPL-SCNC: 24 MMOL/L (ref 20–33)
CREAT SERPL-MCNC: 1.34 MG/DL (ref 0.5–1.4)
D DIMER PPP IA.FEU-MCNC: 1.26 UG/ML (FEU) (ref 0–0.5)
EKG IMPRESSION: NORMAL
EOSINOPHIL # BLD AUTO: 0 K/UL (ref 0–0.51)
EOSINOPHIL NFR BLD: 0 % (ref 0–6.9)
ERYTHROCYTE [DISTWIDTH] IN BLOOD BY AUTOMATED COUNT: 45.8 FL (ref 35.9–50)
ERYTHROCYTE [SEDIMENTATION RATE] IN BLOOD BY WESTERGREN METHOD: 25 MM/HOUR (ref 0–20)
GLOBULIN SER CALC-MCNC: 3.5 G/DL (ref 1.9–3.5)
GLUCOSE SERPL-MCNC: 141 MG/DL (ref 65–99)
HCT VFR BLD AUTO: 49.1 % (ref 42–52)
HGB BLD-MCNC: 16.6 G/DL (ref 14–18)
IMM GRANULOCYTES # BLD AUTO: 0.1 K/UL (ref 0–0.11)
IMM GRANULOCYTES NFR BLD AUTO: 0.8 % (ref 0–0.9)
LYMPHOCYTES # BLD AUTO: 0.45 K/UL (ref 1–4.8)
LYMPHOCYTES NFR BLD: 3.4 % (ref 22–41)
MCH RBC QN AUTO: 28.6 PG (ref 27–33)
MCHC RBC AUTO-ENTMCNC: 33.8 G/DL (ref 33.7–35.3)
MCV RBC AUTO: 84.7 FL (ref 81.4–97.8)
MONOCYTES # BLD AUTO: 0.48 K/UL (ref 0–0.85)
MONOCYTES NFR BLD AUTO: 3.7 % (ref 0–13.4)
NEUTROPHILS # BLD AUTO: 12.09 K/UL (ref 1.82–7.42)
NEUTROPHILS NFR BLD: 91.9 % (ref 44–72)
NRBC # BLD AUTO: 0 K/UL
NRBC BLD-RTO: 0 /100 WBC
PLATELET # BLD AUTO: 236 K/UL (ref 164–446)
PMV BLD AUTO: 10.6 FL (ref 9–12.9)
POTASSIUM SERPL-SCNC: 4.2 MMOL/L (ref 3.6–5.5)
PROT SERPL-MCNC: 6.6 G/DL (ref 6–8.2)
RBC # BLD AUTO: 5.8 M/UL (ref 4.7–6.1)
SODIUM SERPL-SCNC: 135 MMOL/L (ref 135–145)
WBC # BLD AUTO: 13.2 K/UL (ref 4.8–10.8)

## 2020-12-25 PROCEDURE — 87040 BLOOD CULTURE FOR BACTERIA: CPT

## 2020-12-25 PROCEDURE — A9270 NON-COVERED ITEM OR SERVICE: HCPCS | Performed by: STUDENT IN AN ORGANIZED HEALTH CARE EDUCATION/TRAINING PROGRAM

## 2020-12-25 PROCEDURE — 36415 COLL VENOUS BLD VENIPUNCTURE: CPT

## 2020-12-25 PROCEDURE — 700105 HCHG RX REV CODE 258: Performed by: STUDENT IN AN ORGANIZED HEALTH CARE EDUCATION/TRAINING PROGRAM

## 2020-12-25 PROCEDURE — 85652 RBC SED RATE AUTOMATED: CPT

## 2020-12-25 PROCEDURE — 770020 HCHG ROOM/CARE - TELE (206)

## 2020-12-25 PROCEDURE — 85379 FIBRIN DEGRADATION QUANT: CPT

## 2020-12-25 PROCEDURE — 700102 HCHG RX REV CODE 250 W/ 637 OVERRIDE(OP): Performed by: STUDENT IN AN ORGANIZED HEALTH CARE EDUCATION/TRAINING PROGRAM

## 2020-12-25 PROCEDURE — 85025 COMPLETE CBC W/AUTO DIFF WBC: CPT

## 2020-12-25 PROCEDURE — 93010 ELECTROCARDIOGRAM REPORT: CPT | Performed by: INTERNAL MEDICINE

## 2020-12-25 PROCEDURE — 99232 SBSQ HOSP IP/OBS MODERATE 35: CPT | Performed by: STUDENT IN AN ORGANIZED HEALTH CARE EDUCATION/TRAINING PROGRAM

## 2020-12-25 PROCEDURE — 74176 CT ABD & PELVIS W/O CONTRAST: CPT

## 2020-12-25 PROCEDURE — 700111 HCHG RX REV CODE 636 W/ 250 OVERRIDE (IP): Performed by: STUDENT IN AN ORGANIZED HEALTH CARE EDUCATION/TRAINING PROGRAM

## 2020-12-25 PROCEDURE — 80053 COMPREHEN METABOLIC PANEL: CPT

## 2020-12-25 RX ORDER — AZITHROMYCIN 250 MG/1
500 TABLET, FILM COATED ORAL DAILY
Status: DISCONTINUED | OUTPATIENT
Start: 2020-12-25 | End: 2020-12-27 | Stop reason: HOSPADM

## 2020-12-25 RX ADMIN — TAMSULOSIN HYDROCHLORIDE 0.4 MG: 0.4 CAPSULE ORAL at 08:30

## 2020-12-25 RX ADMIN — AMPICILLIN SODIUM AND SULBACTAM SODIUM 3 G: 2; 1 INJECTION, POWDER, FOR SOLUTION INTRAMUSCULAR; INTRAVENOUS at 08:15

## 2020-12-25 RX ADMIN — ENOXAPARIN SODIUM 40 MG: 40 INJECTION SUBCUTANEOUS at 05:20

## 2020-12-25 RX ADMIN — AMPICILLIN SODIUM AND SULBACTAM SODIUM 3 G: 2; 1 INJECTION, POWDER, FOR SOLUTION INTRAMUSCULAR; INTRAVENOUS at 20:16

## 2020-12-25 RX ADMIN — AZITHROMYCIN MONOHYDRATE 500 MG: 250 TABLET ORAL at 08:15

## 2020-12-25 RX ADMIN — ACETAMINOPHEN 1000 MG: 500 TABLET ORAL at 12:00

## 2020-12-25 RX ADMIN — ACETAMINOPHEN 1000 MG: 500 TABLET ORAL at 05:20

## 2020-12-25 RX ADMIN — DOCUSATE SODIUM 50 MG AND SENNOSIDES 8.6 MG 2 TABLET: 8.6; 5 TABLET, FILM COATED ORAL at 05:20

## 2020-12-25 RX ADMIN — DOCUSATE SODIUM 50 MG AND SENNOSIDES 8.6 MG 2 TABLET: 8.6; 5 TABLET, FILM COATED ORAL at 17:44

## 2020-12-25 RX ADMIN — AMPICILLIN SODIUM AND SULBACTAM SODIUM 3 G: 2; 1 INJECTION, POWDER, FOR SOLUTION INTRAMUSCULAR; INTRAVENOUS at 12:00

## 2020-12-25 RX ADMIN — ACETAMINOPHEN 1000 MG: 500 TABLET ORAL at 20:16

## 2020-12-25 ASSESSMENT — FIBROSIS 4 INDEX: FIB4 SCORE: 1.98

## 2020-12-25 NOTE — ASSESSMENT & PLAN NOTE
History of nephrolithiasis and R ureteral stent, which was removed on 11/13/2019 by Dr. Dobbins  Patient reports flank pain and urinary retention, Cr worsening  Abd CT without contrast: No evidence of hydronephrosis. Tiny nonobstructing right renal calculus

## 2020-12-25 NOTE — ASSESSMENT & PLAN NOTE
Patient returns urine on 2 occasions  UA: 2-5 wbc  Place Faulkner  on tamsulosin  Abd CT: no hydronephrosis.  Voiding trial on 12/26, successful. Faulkner removed

## 2020-12-25 NOTE — ASSESSMENT & PLAN NOTE
Secondary to Covid pneumonia  Requiring 2 L nasal cannula, titrate as tolerable  IS  Weaning off O2 as tolerated  Supportive managements  Cont covid treatments

## 2020-12-25 NOTE — PROGRESS NOTES
[facility] Medicine Daily Progress Note    Date of Service  12/25/2020    Chief Complaint  65 y.o. male admitted 12/24/2020 with   Chief Complaint   Patient presents with   • Weakness   • Failure to Thrive       Hospital Course  65 y.o. male was admitted for 2-week history of dyspnea, general weakness, malaise and fatigue. Patient also reporting back pain and suprapubic tenderness. At the emergency department, he was noted to have sinus tachycardia, and tachypnea. Patient requiring 2 L nasal cannula to maintain saturations.  In ED, Covid positive.  Lactic acid initially at 2.6, improved to 1.7 after IVF.  He was placed on Faulkner due to urinary retention. Chest x-ray concerning for Covid pneumonia. procalcitonin 1.02. CRP 13.39    Covid: on 3L O2, start decadron 12/24-1/3  Sepsis sec to covid pneumonia: blood culture pending, on unasyn and azithromycin  PT/OT  ?POA    Interval Problem Update  Patient was seen and examined at the bedside. No overnight events reported.  VS reviewed.   Patient is alert, awake, but confused.  He did follow commands intermittently. I asked him regards code status. He said he does not want any CPR or intubation. However given he is confused, unable to confirm. No family listed in the chart. Only a friend name listed. Will ask CM to find POA     Consultants/Specialty  none    Code Status  Full Code    Disposition  TBD    Review of Systems  Review of Systems   Unable to perform ROS: Mental acuity        Physical Exam  Temp:  [36.8 °C (98.2 °F)-37.8 °C (100.1 °F)] 36.8 °C (98.2 °F)  Pulse:  [] 96  Resp:  [15-48] 20  BP: (103-150)/(65-87) 107/76  SpO2:  [83 %-96 %] 92 %    Physical Exam  Vitals signs and nursing note reviewed.   Constitutional:       General: He is not in acute distress.     Appearance: He is ill-appearing.   HENT:      Head: Normocephalic and atraumatic.      Mouth/Throat:      Mouth: Mucous membranes are dry.      Pharynx: Oropharynx is clear.   Eyes:      Pupils: Pupils  are equal, round, and reactive to light.   Cardiovascular:      Rate and Rhythm: Normal rate and regular rhythm.   Pulmonary:      Effort: Pulmonary effort is normal. No respiratory distress.      Breath sounds: Rales present. No wheezing.      Comments: Diminished breath sound bilateral  Abdominal:      General: Abdomen is flat. Bowel sounds are normal.      Palpations: Abdomen is soft.   Genitourinary:     Comments: Faulkner in place  Musculoskeletal:      Right lower leg: No edema.      Left lower leg: No edema.   Skin:     General: Skin is warm and dry.   Neurological:      Mental Status: He is alert.      Comments: Alert, awake, confused, oriented to himself  Follows commands intermittently         Fluids    Intake/Output Summary (Last 24 hours) at 12/25/2020 1037  Last data filed at 12/24/2020 1913  Gross per 24 hour   Intake 100 ml   Output 300 ml   Net -200 ml       Laboratory  Recent Labs     12/24/20  1353 12/25/20  0811   WBC 10.0 13.2*   RBC 6.19* 5.80   HEMOGLOBIN 17.6 16.6   HEMATOCRIT 52.3* 49.1   MCV 84.5 84.7   MCH 28.4 28.6   MCHC 33.7 33.8   RDW 45.5 45.8   PLATELETCT 204 236   MPV 10.1 10.6     Recent Labs     12/24/20  1353 12/25/20  0811   SODIUM 137 135   POTASSIUM 4.1 4.2   CHLORIDE 102 103   CO2 23 24   GLUCOSE 117* 141*   BUN 32* 44*   CREATININE 1.07 1.34   CALCIUM 9.3 9.6                   Imaging  DX-CHEST-LIMITED (1 VIEW)   Final Result      Hypoinflation with patchy ill-defined opacities in both lungs suggesting multifocal pneumonia, possibly Covid 19 pneumonia.           Assessment/Plan  * Acute respiratory failure with hypoxia (HCC)  Assessment & Plan  Secondary to Covid pneumonia  Requiring 2 L nasal cannula, titrate as tolerable  IS  Weaning off O2 as tolerated  Supportive managements  Cont covid treatments           Pneumonia due to COVID-19 virus  Assessment & Plan  Covid noted on nasopharyngeal swab and chest x-ray  Acute hypoxic respiratory failure requiring 2 L nasal cannula  On  O2  CRP 13.39, procalcitonin 1.02  CXR concerning multifocal pneumonia, possibly COVID-19 pneumonia  Blood culture pending  Decadron starting 12/24-1/3  ABx: Unasyn starting 12/25, azithromycin starting 12/25  Weaning off oxygen as tolerated  Supportive managements: IS, self prone, judicious fluid        History of nephrolithiasis  Assessment & Plan  History of nephrolithiasis and R ureteral stent, which was removed on 11/13/2019 by Dr. Dobbins  Patient reports flank pain and urinary retention, Cr worsening  Will order abd CT without contrast for further evaluation    Sepsis (HCC)  Assessment & Plan  This is Sepsis Present on admission  SIRS criteria identified on my evaluation include: Tachycardia, with heart rate greater than 90 BPM, Tachypnea, with respirations greater than 20 per minute and Leukocytosis, with WBC greater than 12,000  Source is covid pneumonia  Sepsis protocol initiated  Fluid resuscitation ordered per protocol  IV antibiotics as appropriate for source of sepsis  While organ dysfunction may be noted elsewhere in this problem list or in the chart, degree of organ dysfunction does not meet CMS criteria for severe sepsis    Lactic acid trending down 2.6>1.7  Blood culture pending  Procalcitonin: 1.02  Abx: unasyn and azithromycin starting 12/25      Urinary retention  Assessment & Plan  Patient returns urine on 2 occasions  UA: 2-5 wbc  Place Faulkner  Start on tamsulosin  Will order abd CT w/o contrast for further evaluation    Hypertension- (present on admission)  Assessment & Plan  As needed labetalol       VTE prophylaxis: lovenox

## 2020-12-25 NOTE — H&P
[facility] Medicine History & Physical Note    Date of Service  12/24/2020    Primary Care Physician  Jennifer Segundo M.D.    Consultants  None    Code Status  FULL    Chief Complaint  Chief Complaint   Patient presents with   • Weakness   • Failure to Thrive       History of Presenting Illness  65 y.o. male presented to the emergency department with a 2-week history of dyspnea, general weakness, malaise and fatigue. Patient also reporting back pain and suprapubic tenderness. At the emergency department, vital signs with sinus tachycardia, and tachypnea. Patient requiring 2 L nasal cannula to maintain saturations.  Patient noted for Covid on nasopharyngeal swab and on chest x-ray.  No anemia or leukocytosis on CBC.  Chemistry without gross abnormality.  Lactic acid initially at 2.6 but decreased down to 1.7 after an IV fluid bolus.  Patient noted to be retaining urine on 2 occasions and thus Faulkner catheter was inserted.  Patient was started on a Decadron regimen and was admitted to the Covid floor for acute hypoxic respiratory failure secondary to Covid pneumonia.    Review of Systems  Review of Systems   Unable to perform ROS: Mental status change       Past Medical History   has a past medical history of Bladder stones (05/25/2018), Cataract (05/25/2018), Dental disorder, Heart burn (05/25/2018), Hypertension (05/25/2018), and Urinary bladder disorder (05/25/2018).    Surgical History   has a past surgical history that includes hernia repair; bladder biopsy with cystoscopy (02/2018); bladder biopsy with cystoscopy (6/1/2018); retrogrades (6/1/2018); lasertripsy (6/1/2018); ureteroscopy (N/A, 11/13/2019); lasertripsy (Right, 11/13/2019); and cystoscopy (11/13/2019).     Family History  family history is not on file.     Social History   reports that he has been smoking cigarettes. He has a 20.00 pack-year smoking history. He has never used smokeless tobacco. He reports that he does not drink alcohol or use  drugs.    Allergies  No Known Allergies    Medications  Prior to Admission Medications   Prescriptions Last Dose Informant Patient Reported? Taking?   acetaminophen (TYLENOL) 500 MG Tab >3 days ago at unknown Patient Yes Yes   Sig: Take 1,000 mg by mouth every 6 hours as needed.   tamsulosin (FLOMAX) 0.4 MG capsule Not Taking at Unknown time Patient No No   Sig: Take 1 Cap by mouth ONE-HALF HOUR AFTER BREAKFAST.   Patient not taking: Reported on 12/24/2020      Facility-Administered Medications: None       Physical Exam  Temp:  [37.8 °C (100.1 °F)] 37.8 °C (100.1 °F)  Pulse:  [] 97  Resp:  [15-27] 27  BP: (103-143)/(65-87) 143/87  SpO2:  [85 %-96 %] 96 %    Physical Exam  Constitutional:       General: He is in acute distress.      Appearance: He is normal weight. He is ill-appearing.   HENT:      Head: Normocephalic and atraumatic.      Nose: Nose normal. No congestion.      Mouth/Throat:      Mouth: Mucous membranes are moist.   Eyes:      Extraocular Movements: Extraocular movements intact.      Pupils: Pupils are equal, round, and reactive to light.   Neck:      Musculoskeletal: Normal range of motion and neck supple.   Cardiovascular:      Rate and Rhythm: Normal rate and regular rhythm.      Pulses: Normal pulses.      Heart sounds: Normal heart sounds.   Pulmonary:      Effort: Pulmonary effort is normal. No respiratory distress.      Breath sounds: Rales present.   Chest:      Chest wall: No tenderness.   Abdominal:      General: Bowel sounds are normal. There is no distension.      Palpations: Abdomen is soft.      Tenderness: There is abdominal tenderness. There is no guarding or rebound.   Musculoskeletal: Normal range of motion.         General: No swelling or tenderness.      Right lower leg: No edema.      Left lower leg: No edema.   Skin:     General: Skin is warm.      Coloration: Skin is not jaundiced.   Neurological:      General: No focal deficit present.      Mental Status: He is alert. He  is disoriented.      Cranial Nerves: No cranial nerve deficit.         Laboratory:  Recent Labs     12/24/20  1353   WBC 10.0   RBC 6.19*   HEMOGLOBIN 17.6   HEMATOCRIT 52.3*   MCV 84.5   MCH 28.4   MCHC 33.7   RDW 45.5   PLATELETCT 204   MPV 10.1     Recent Labs     12/24/20  1353   SODIUM 137   POTASSIUM 4.1   CHLORIDE 102   CO2 23   GLUCOSE 117*   BUN 32*   CREATININE 1.07   CALCIUM 9.3     Recent Labs     12/24/20  1353   ALTSGPT 21   ASTSGOT 28   ALKPHOSPHAT 86   TBILIRUBIN 0.8   GLUCOSE 117*         No results for input(s): NTPROBNP in the last 72 hours.      Recent Labs     12/24/20  1353   TROPONINT 14       Imaging:  DX-CHEST-LIMITED (1 VIEW)   Final Result      Hypoinflation with patchy ill-defined opacities in both lungs suggesting multifocal pneumonia, possibly Covid 19 pneumonia.            Assessment/Plan:  I anticipate this patient will require at least two midnights for appropriate medical management, necessitating inpatient admission.    * Acute respiratory failure with hypoxia (HCC)  Assessment & Plan  Secondary to Covid pneumonia  Requiring 2 L nasal cannula, titrate as tolerable  Frequent symptom spirometry  Up to chair for all meals    Urinary retention  Assessment & Plan  Patient returns urine on 2 occasions  UA a suggestive of UTI  No MIKY on chemistry  Place Faulkner  Start on tamsulosin    Pneumonia due to COVID-19 virus  Assessment & Plan  Covid noted on nasopharyngeal swab and chest x-ray  Acute hypoxic respiratory failure requiring 2 L nasal cannula  Admit to Covid Tele  Diabetic diet with fluid restriction  Start PO steroid regimen  Pending CRP and procalcitonin  Pending Procal  Day team to consider ID consult for remdesivir if warranted  Labs on AM    Hypertension- (present on admission)  Assessment & Plan  As needed labetalol

## 2020-12-25 NOTE — ASSESSMENT & PLAN NOTE
This is Sepsis Present on admission  SIRS criteria identified on my evaluation include: Tachycardia, with heart rate greater than 90 BPM, Tachypnea, with respirations greater than 20 per minute and Leukocytosis, with WBC greater than 12,000  Source is covid pneumonia  Sepsis protocol initiated  Fluid resuscitation ordered per protocol  IV antibiotics as appropriate for source of sepsis  While organ dysfunction may be noted elsewhere in this problem list or in the chart, degree of organ dysfunction does not meet CMS criteria for severe sepsis    Lactic acid trending down 2.6>1.7  Blood culture negative  Procalcitonin: 1.02  Abx: unasyn and azithromycin starting 12/25

## 2020-12-25 NOTE — PROGRESS NOTES
Pt arrived on unit. Transferred safely to bed, VSS, 3L NC assessment complete. Will continue to monitor.

## 2020-12-25 NOTE — ASSESSMENT & PLAN NOTE
Covid noted on nasopharyngeal swab and chest x-ray  Acute hypoxic respiratory failure requiring 3.5 L nasal cannula  CRP 13.39, procalcitonin 1.02  CXR concerning multifocal pneumonia, possibly COVID-19 pneumonia  Blood culture negative  - Decadron starting 12/24-1/3  - ABx: Unasyn starting 12/25, azithromycin starting 12/25  - Supportive managements: IS, self prone, judicious fluid  Weaning off oxygen as tolerated

## 2020-12-26 LAB
ANION GAP SERPL CALC-SCNC: 11 MMOL/L (ref 7–16)
BACTERIA UR CULT: NORMAL
BASOPHILS # BLD AUTO: 0.2 % (ref 0–1.8)
BASOPHILS # BLD: 0.02 K/UL (ref 0–0.12)
BUN SERPL-MCNC: 42 MG/DL (ref 8–22)
CALCIUM SERPL-MCNC: 9.1 MG/DL (ref 8.5–10.5)
CHLORIDE SERPL-SCNC: 100 MMOL/L (ref 96–112)
CO2 SERPL-SCNC: 27 MMOL/L (ref 20–33)
CREAT SERPL-MCNC: 1.04 MG/DL (ref 0.5–1.4)
EOSINOPHIL # BLD AUTO: 0 K/UL (ref 0–0.51)
EOSINOPHIL NFR BLD: 0 % (ref 0–6.9)
ERYTHROCYTE [DISTWIDTH] IN BLOOD BY AUTOMATED COUNT: 46.1 FL (ref 35.9–50)
GLUCOSE SERPL-MCNC: 112 MG/DL (ref 65–99)
HCT VFR BLD AUTO: 44.3 % (ref 42–52)
HGB BLD-MCNC: 14.8 G/DL (ref 14–18)
IMM GRANULOCYTES # BLD AUTO: 0.08 K/UL (ref 0–0.11)
IMM GRANULOCYTES NFR BLD AUTO: 0.8 % (ref 0–0.9)
LYMPHOCYTES # BLD AUTO: 0.69 K/UL (ref 1–4.8)
LYMPHOCYTES NFR BLD: 7 % (ref 22–41)
MCH RBC QN AUTO: 28.2 PG (ref 27–33)
MCHC RBC AUTO-ENTMCNC: 33.4 G/DL (ref 33.7–35.3)
MCV RBC AUTO: 84.5 FL (ref 81.4–97.8)
MONOCYTES # BLD AUTO: 0.62 K/UL (ref 0–0.85)
MONOCYTES NFR BLD AUTO: 6.3 % (ref 0–13.4)
NEUTROPHILS # BLD AUTO: 8.39 K/UL (ref 1.82–7.42)
NEUTROPHILS NFR BLD: 85.7 % (ref 44–72)
NRBC # BLD AUTO: 0 K/UL
NRBC BLD-RTO: 0 /100 WBC
PLATELET # BLD AUTO: 273 K/UL (ref 164–446)
PMV BLD AUTO: 10.6 FL (ref 9–12.9)
POTASSIUM SERPL-SCNC: 4.2 MMOL/L (ref 3.6–5.5)
RBC # BLD AUTO: 5.24 M/UL (ref 4.7–6.1)
SIGNIFICANT IND 70042: NORMAL
SITE SITE: NORMAL
SODIUM SERPL-SCNC: 138 MMOL/L (ref 135–145)
SOURCE SOURCE: NORMAL
WBC # BLD AUTO: 9.8 K/UL (ref 4.8–10.8)

## 2020-12-26 PROCEDURE — 99233 SBSQ HOSP IP/OBS HIGH 50: CPT | Performed by: STUDENT IN AN ORGANIZED HEALTH CARE EDUCATION/TRAINING PROGRAM

## 2020-12-26 PROCEDURE — A9270 NON-COVERED ITEM OR SERVICE: HCPCS | Performed by: STUDENT IN AN ORGANIZED HEALTH CARE EDUCATION/TRAINING PROGRAM

## 2020-12-26 PROCEDURE — 700111 HCHG RX REV CODE 636 W/ 250 OVERRIDE (IP): Performed by: STUDENT IN AN ORGANIZED HEALTH CARE EDUCATION/TRAINING PROGRAM

## 2020-12-26 PROCEDURE — 770020 HCHG ROOM/CARE - TELE (206)

## 2020-12-26 PROCEDURE — 36415 COLL VENOUS BLD VENIPUNCTURE: CPT

## 2020-12-26 PROCEDURE — 700102 HCHG RX REV CODE 250 W/ 637 OVERRIDE(OP): Performed by: STUDENT IN AN ORGANIZED HEALTH CARE EDUCATION/TRAINING PROGRAM

## 2020-12-26 PROCEDURE — 80048 BASIC METABOLIC PNL TOTAL CA: CPT

## 2020-12-26 PROCEDURE — 85025 COMPLETE CBC W/AUTO DIFF WBC: CPT

## 2020-12-26 PROCEDURE — 700105 HCHG RX REV CODE 258: Performed by: STUDENT IN AN ORGANIZED HEALTH CARE EDUCATION/TRAINING PROGRAM

## 2020-12-26 RX ORDER — ACETAMINOPHEN 325 MG/1
650 TABLET ORAL EVERY 6 HOURS PRN
Status: DISCONTINUED | OUTPATIENT
Start: 2020-12-26 | End: 2020-12-27 | Stop reason: HOSPADM

## 2020-12-26 RX ORDER — AZITHROMYCIN 250 MG/1
500 TABLET, FILM COATED ORAL ONCE
Status: COMPLETED | OUTPATIENT
Start: 2020-12-26 | End: 2020-12-26

## 2020-12-26 RX ORDER — DEXAMETHASONE 4 MG/1
6 TABLET ORAL ONCE
Status: COMPLETED | OUTPATIENT
Start: 2020-12-26 | End: 2020-12-26

## 2020-12-26 RX ADMIN — MAGNESIUM HYDROXIDE 30 ML: 400 SUSPENSION ORAL at 02:12

## 2020-12-26 RX ADMIN — DEXAMETHASONE 6 MG: 4 TABLET ORAL at 09:27

## 2020-12-26 RX ADMIN — DOCUSATE SODIUM 50 MG AND SENNOSIDES 8.6 MG 2 TABLET: 8.6; 5 TABLET, FILM COATED ORAL at 05:04

## 2020-12-26 RX ADMIN — AMPICILLIN SODIUM AND SULBACTAM SODIUM 3 G: 2; 1 INJECTION, POWDER, FOR SOLUTION INTRAMUSCULAR; INTRAVENOUS at 00:15

## 2020-12-26 RX ADMIN — TAMSULOSIN HYDROCHLORIDE 0.4 MG: 0.4 CAPSULE ORAL at 10:21

## 2020-12-26 RX ADMIN — AMPICILLIN SODIUM AND SULBACTAM SODIUM 3 G: 2; 1 INJECTION, POWDER, FOR SOLUTION INTRAMUSCULAR; INTRAVENOUS at 17:43

## 2020-12-26 RX ADMIN — AMPICILLIN SODIUM AND SULBACTAM SODIUM 3 G: 2; 1 INJECTION, POWDER, FOR SOLUTION INTRAMUSCULAR; INTRAVENOUS at 05:05

## 2020-12-26 RX ADMIN — AZITHROMYCIN MONOHYDRATE 500 MG: 250 TABLET ORAL at 09:27

## 2020-12-26 RX ADMIN — AMPICILLIN SODIUM AND SULBACTAM SODIUM 3 G: 2; 1 INJECTION, POWDER, FOR SOLUTION INTRAMUSCULAR; INTRAVENOUS at 12:12

## 2020-12-26 ASSESSMENT — ENCOUNTER SYMPTOMS
HEMOPTYSIS: 0
COUGH: 1
FEVER: 0
NAUSEA: 0
VOMITING: 0
FOCAL WEAKNESS: 0
CHILLS: 0
BLURRED VISION: 0
MYALGIAS: 0
DEPRESSION: 0
PALPITATIONS: 0
BRUISES/BLEEDS EASILY: 0

## 2020-12-26 ASSESSMENT — FIBROSIS 4 INDEX: FIB4 SCORE: 1.71

## 2020-12-26 ASSESSMENT — PAIN DESCRIPTION - PAIN TYPE: TYPE: ACUTE PAIN

## 2020-12-26 NOTE — PROGRESS NOTES
[facility] Medicine Daily Progress Note    Date of Service  12/26/2020    Chief Complaint  65 y.o. male admitted 12/24/2020 with   Chief Complaint   Patient presents with   • Weakness   • Failure to Thrive       Hospital Course  65 y.o. male was admitted for 2-week history of dyspnea, general weakness, malaise and fatigue. Patient also reporting back pain and suprapubic tenderness. At the emergency department, he was noted to have sinus tachycardia, and tachypnea. Patient requiring 2 L nasal cannula to maintain saturations.  In ED, Covid positive.  Lactic acid initially at 2.6, improved to 1.7 after IVF.  He was placed on Faulkner due to urinary retention. Chest x-ray concerning for Covid pneumonia. procalcitonin 1.02. CRP 13.39    Covid: on 3L O2, start decadron 12/24-1/3  Sepsis sec to covid pneumonia: blood culture negative, on unasyn and azithromycin  PT/OT    Interval Problem Update  Patient was seen and examined at the bedside. No overnight events reported.  VS reviewed.   Patient is alert, awake, AOx4 today. Discussed code status and he wants DNR/DNI (witnessed by RN). Code status changed. Will have POLST signed.    Consultants/Specialty  none    Code Status  DNAR/DNI    Disposition  TBD    Review of Systems  Review of Systems   Constitutional: Positive for malaise/fatigue. Negative for chills and fever.   HENT: Negative for ear discharge.    Eyes: Negative for blurred vision.   Respiratory: Positive for cough. Negative for hemoptysis.    Cardiovascular: Negative for chest pain and palpitations.   Gastrointestinal: Negative for nausea and vomiting.   Genitourinary: Negative for dysuria.   Musculoskeletal: Negative for myalgias.   Skin: Negative for rash.   Neurological: Negative for focal weakness.   Endo/Heme/Allergies: Does not bruise/bleed easily.   Psychiatric/Behavioral: Negative for depression.        Physical Exam  Temp:  [36.1 °C (97 °F)-37.3 °C (99.2 °F)] 36.8 °C (98.3 °F)  Pulse:  [] 92  Resp:   [16-20] 16  BP: (103-140)/(58-86) 140/86  SpO2:  [90 %-95 %] 91 %    Physical Exam  Vitals signs and nursing note reviewed.   Constitutional:       General: He is not in acute distress.     Appearance: He is ill-appearing.   HENT:      Head: Normocephalic and atraumatic.      Mouth/Throat:      Mouth: Mucous membranes are dry.      Pharynx: Oropharynx is clear.   Eyes:      Pupils: Pupils are equal, round, and reactive to light.   Cardiovascular:      Rate and Rhythm: Normal rate and regular rhythm.   Pulmonary:      Effort: Pulmonary effort is normal. No respiratory distress.      Breath sounds: Rales present. No wheezing.      Comments: Diminished breath sound bilateral  Abdominal:      General: Abdomen is flat. Bowel sounds are normal.      Palpations: Abdomen is soft.   Genitourinary:     Comments: Faulkner in place  Musculoskeletal:      Right lower leg: No edema.      Left lower leg: No edema.   Skin:     General: Skin is warm and dry.   Neurological:      General: No focal deficit present.      Mental Status: He is alert and oriented to person, place, and time.   Psychiatric:         Mood and Affect: Mood normal.         Behavior: Behavior normal.         Fluids    Intake/Output Summary (Last 24 hours) at 12/26/2020 1113  Last data filed at 12/26/2020 0900  Gross per 24 hour   Intake 360 ml   Output 3000 ml   Net -2640 ml       Laboratory  Recent Labs     12/24/20  1353 12/25/20  0811 12/26/20  0726   WBC 10.0 13.2* 9.8   RBC 6.19* 5.80 5.24   HEMOGLOBIN 17.6 16.6 14.8   HEMATOCRIT 52.3* 49.1 44.3   MCV 84.5 84.7 84.5   MCH 28.4 28.6 28.2   MCHC 33.7 33.8 33.4*   RDW 45.5 45.8 46.1   PLATELETCT 204 236 273   MPV 10.1 10.6 10.6     Recent Labs     12/24/20  1353 12/25/20  0811 12/26/20  0726   SODIUM 137 135 138   POTASSIUM 4.1 4.2 4.2   CHLORIDE 102 103 100   CO2 23 24 27   GLUCOSE 117* 141* 112*   BUN 32* 44* 42*   CREATININE 1.07 1.34 1.04   CALCIUM 9.3 9.6 9.1                   Imaging  CT-ABDOMEN-PELVIS W/O    Final Result      Tiny nonobstructing right renal calculus.      No evidence of hydronephrosis.      Bladder calculi are seen. Bladder is thick-walled. Correlation with urinalysis is recommended.      Colonic diverticulosis.      Rectal fecal impaction.      Atherosclerotic plaque within a mildly ectatic aorta.      Geographic hepatic steatosis.      Bilateral ill-defined groundglass airspace opacities compatible with Covid 19 pneumonia.      DX-CHEST-LIMITED (1 VIEW)   Final Result      Hypoinflation with patchy ill-defined opacities in both lungs suggesting multifocal pneumonia, possibly Covid 19 pneumonia.           Assessment/Plan  * Acute respiratory failure with hypoxia (HCC)  Assessment & Plan  Secondary to Covid pneumonia  Requiring 2 L nasal cannula, titrate as tolerable  IS  Weaning off O2 as tolerated  Supportive managements  Cont covid treatments           Pneumonia due to COVID-19 virus  Assessment & Plan  Covid noted on nasopharyngeal swab and chest x-ray  Acute hypoxic respiratory failure requiring 3.5 L nasal cannula  CRP 13.39, procalcitonin 1.02  CXR concerning multifocal pneumonia, possibly COVID-19 pneumonia  Blood culture negative  - Decadron starting 12/24-1/3  - ABx: Unasyn starting 12/25, azithromycin starting 12/25  - Supportive managements: IS, self prone, judicious fluid  Weaning off oxygen as tolerated          History of nephrolithiasis  Assessment & Plan  History of nephrolithiasis and R ureteral stent, which was removed on 11/13/2019 by Dr. Dobbins  Patient reports flank pain and urinary retention, Cr worsening  Abd CT without contrast: No evidence of hydronephrosis. Tiny nonobstructing right renal calculus     Sepsis (HCC)  Assessment & Plan  This is Sepsis Present on admission  SIRS criteria identified on my evaluation include: Tachycardia, with heart rate greater than 90 BPM, Tachypnea, with respirations greater than 20 per minute and Leukocytosis, with WBC greater than  12,000  Source is covid pneumonia  Sepsis protocol initiated  Fluid resuscitation ordered per protocol  IV antibiotics as appropriate for source of sepsis  While organ dysfunction may be noted elsewhere in this problem list or in the chart, degree of organ dysfunction does not meet CMS criteria for severe sepsis    Lactic acid trending down 2.6>1.7  Blood culture negative  Procalcitonin: 1.02  Abx: unasyn and azithromycin starting 12/25      Urinary retention  Assessment & Plan  Patient returns urine on 2 occasions  UA: 2-5 wbc  Place Faulkner  Start on tamsulosin  Abd CT: no hydronephrosis.  Voiding trial on 12/26    Hypertension- (present on admission)  Assessment & Plan  As needed labetalol       VTE prophylaxis: lovenox

## 2020-12-26 NOTE — PROGRESS NOTES
Pt refused most meds this AM. Pt stated that he feels he does not have covid. PT educated on covid and the positive test. As well as the importance of taking his medication. Pt still refused meds. Will continue to monitor.

## 2020-12-26 NOTE — PROGRESS NOTES
Assumed care of PT A&O 3. Pt resting in bed with no signs of labored breathing. On 4L NC sating 86%. Tele monitor in place, cardiac rhythm being monitored. Call light within reach, bed in lowest position, upper bed rails up. Pt was updated on plan of care for the day. Will continue to monitor.

## 2020-12-26 NOTE — PROGRESS NOTES
Received report from NOC RN. Assumed care of patient at 0700. Patient A&Ox 4, speaking in full sentences, follows commands and responds appropriately to questions. On 4L NC, no signs of respiratory distress. Respirations are even and unlabored. POC discussed and agreed upon with patient. Call light and belongings within reach. Bed in lowest locked position. Upper side rails raised. Fall risk precautions in place. Hourly rounding. Patient on tele box. Will continue to monitor respiratory status.

## 2020-12-27 VITALS
OXYGEN SATURATION: 97 % | RESPIRATION RATE: 16 BRPM | HEIGHT: 71 IN | WEIGHT: 173.5 LBS | BODY MASS INDEX: 24.29 KG/M2 | HEART RATE: 49 BPM | TEMPERATURE: 97.6 F | DIASTOLIC BLOOD PRESSURE: 52 MMHG | SYSTOLIC BLOOD PRESSURE: 107 MMHG

## 2020-12-27 PROBLEM — R33.9 URINARY RETENTION: Status: RESOLVED | Noted: 2020-12-24 | Resolved: 2020-12-27

## 2020-12-27 PROBLEM — A41.9 SEPSIS (HCC): Status: RESOLVED | Noted: 2020-12-25 | Resolved: 2020-12-27

## 2020-12-27 PROBLEM — R00.1 BRADYCARDIA: Status: ACTIVE | Noted: 2020-12-27

## 2020-12-27 LAB — PROCALCITONIN SERPL-MCNC: 1.33 NG/ML

## 2020-12-27 PROCEDURE — 36415 COLL VENOUS BLD VENIPUNCTURE: CPT

## 2020-12-27 PROCEDURE — 99239 HOSP IP/OBS DSCHRG MGMT >30: CPT | Performed by: STUDENT IN AN ORGANIZED HEALTH CARE EDUCATION/TRAINING PROGRAM

## 2020-12-27 PROCEDURE — 93005 ELECTROCARDIOGRAM TRACING: CPT | Performed by: STUDENT IN AN ORGANIZED HEALTH CARE EDUCATION/TRAINING PROGRAM

## 2020-12-27 PROCEDURE — 84145 PROCALCITONIN (PCT): CPT

## 2020-12-27 ASSESSMENT — ENCOUNTER SYMPTOMS
FEVER: 0
COUGH: 1
BLURRED VISION: 0
NAUSEA: 0
DEPRESSION: 0
MYALGIAS: 0
FOCAL WEAKNESS: 0
BRUISES/BLEEDS EASILY: 0
PALPITATIONS: 0
CHILLS: 0
VOMITING: 0
HEMOPTYSIS: 0

## 2020-12-27 NOTE — PROGRESS NOTES
Received report from NOC RN. Assumed care of patient at 0700. Patient A&Ox 4, speaking in full sentences, follows commands and responds appropriately to questions. On 3L NC, no signs of respiratory distress. Respirations are even and unlabored. POC discussed and agreed upon with patient. Call light and belongings within reach. Bed in lowest locked position. Upper side rails raised. Bed alarm on. Fall risk precautions in place. Hourly rounding. Patient on tele box. Will continue to monitor respiratory status.

## 2020-12-27 NOTE — PROGRESS NOTES
Bedside report received. Patient lying in bed, calm with unlabored breathing.Pt. needs are assessed, bed in lowest position and locked, call light within reach. Fall precautions in place, pt. educated to call for assistance. Hourly rounding initiated.

## 2020-12-27 NOTE — PROGRESS NOTES
[facility] Medicine Daily Progress Note    Date of Service  12/27/2020    Chief Complaint  65 y.o. male admitted 12/24/2020 with   Chief Complaint   Patient presents with   • Weakness   • Failure to Thrive       Hospital Course  65 y.o. male was admitted for 2-week history of dyspnea, general weakness, malaise and fatigue. Patient also reporting back pain and suprapubic tenderness. At the emergency department, he was noted to have sinus tachycardia, and tachypnea. Patient requiring 2 L nasal cannula to maintain saturations.  In ED, Covid positive.  Lactic acid initially at 2.6, improved to 1.7 after IVF.  He was placed on Faulkner due to urinary retention. Chest x-ray concerning for Covid pneumonia. procalcitonin 1.02. CRP 13.39    Covid: on 3L O2, start decadron 12/24-1/3  Sepsis sec to covid pneumonia: blood culture negative, on unasyn and azithromycin since 12/25  Repeated procalcitonin pending  PT/OT pending    Interval Problem Update  Patient was seen and examined at the bedside. No overnight events reported.  VS reviewed.   Patient is alert, awake, AOx4 today. DNR/DNI, POLST signed.  He keeps threatening leaving AMA. He is AOx4 and understood the consequence of leaving AMA, including death. He does not believe he has covid. He refuses medications.   PT/OT pending; oxygen evaluation pending  Per RN, his HR 49 while awake and down to 35. EKG ordered    Consultants/Specialty  none    Code Status  DNAR/DNI    Disposition  TBD    Review of Systems  Review of Systems   Constitutional: Positive for malaise/fatigue. Negative for chills and fever.   HENT: Negative for ear discharge.    Eyes: Negative for blurred vision.   Respiratory: Positive for cough. Negative for hemoptysis.    Cardiovascular: Negative for chest pain and palpitations.   Gastrointestinal: Negative for nausea and vomiting.   Genitourinary: Negative for dysuria.   Musculoskeletal: Negative for myalgias.   Skin: Negative for rash.   Neurological: Negative  for focal weakness.   Endo/Heme/Allergies: Does not bruise/bleed easily.   Psychiatric/Behavioral: Negative for depression.        Physical Exam  Temp:  [36.3 °C (97.3 °F)-36.9 °C (98.4 °F)] 36.5 °C (97.7 °F)  Pulse:  [] 67  Resp:  [17-20] 17  BP: (108-137)/(62-83) 108/62  SpO2:  [86 %-96 %] 96 %    Physical Exam  Vitals signs and nursing note reviewed.   Constitutional:       General: He is not in acute distress.  HENT:      Head: Normocephalic and atraumatic.      Mouth/Throat:      Mouth: Mucous membranes are dry.      Pharynx: Oropharynx is clear.   Eyes:      Pupils: Pupils are equal, round, and reactive to light.   Cardiovascular:      Rate and Rhythm: Regular rhythm. Bradycardia present.   Pulmonary:      Effort: Pulmonary effort is normal. No respiratory distress.      Breath sounds: Rales present. No wheezing.      Comments: Diminished breath sound bilateral  Abdominal:      General: Abdomen is flat. Bowel sounds are normal.      Palpations: Abdomen is soft.   Musculoskeletal:      Right lower leg: No edema.      Left lower leg: No edema.   Skin:     General: Skin is warm and dry.   Neurological:      General: No focal deficit present.      Mental Status: He is alert and oriented to person, place, and time.   Psychiatric:         Mood and Affect: Mood normal.         Behavior: Behavior normal.         Fluids    Intake/Output Summary (Last 24 hours) at 12/27/2020 1256  Last data filed at 12/27/2020 1000  Gross per 24 hour   Intake 960 ml   Output 1070 ml   Net -110 ml       Laboratory  Recent Labs     12/24/20  1353 12/25/20  0811 12/26/20  0726   WBC 10.0 13.2* 9.8   RBC 6.19* 5.80 5.24   HEMOGLOBIN 17.6 16.6 14.8   HEMATOCRIT 52.3* 49.1 44.3   MCV 84.5 84.7 84.5   MCH 28.4 28.6 28.2   MCHC 33.7 33.8 33.4*   RDW 45.5 45.8 46.1   PLATELETCT 204 236 273   MPV 10.1 10.6 10.6     Recent Labs     12/24/20  1353 12/25/20  0811 12/26/20  0726   SODIUM 137 135 138   POTASSIUM 4.1 4.2 4.2   CHLORIDE 102 103  100   CO2 23 24 27   GLUCOSE 117* 141* 112*   BUN 32* 44* 42*   CREATININE 1.07 1.34 1.04   CALCIUM 9.3 9.6 9.1                   Imaging  CT-ABDOMEN-PELVIS W/O   Final Result      Tiny nonobstructing right renal calculus.      No evidence of hydronephrosis.      Bladder calculi are seen. Bladder is thick-walled. Correlation with urinalysis is recommended.      Colonic diverticulosis.      Rectal fecal impaction.      Atherosclerotic plaque within a mildly ectatic aorta.      Geographic hepatic steatosis.      Bilateral ill-defined groundglass airspace opacities compatible with Covid 19 pneumonia.      DX-CHEST-LIMITED (1 VIEW)   Final Result      Hypoinflation with patchy ill-defined opacities in both lungs suggesting multifocal pneumonia, possibly Covid 19 pneumonia.           Assessment/Plan  * Acute respiratory failure with hypoxia (HCC)  Assessment & Plan  Secondary to Covid pneumonia  Requiring 2 L nasal cannula, titrate as tolerable  IS  Weaning off O2 as tolerated  Supportive managements  Cont covid treatments           Pneumonia due to COVID-19 virus  Assessment & Plan  Covid noted on nasopharyngeal swab and chest x-ray  Acute hypoxic respiratory failure requiring 3.5 L nasal cannula  CRP 13.39, procalcitonin 1.02  CXR concerning multifocal pneumonia, possibly COVID-19 pneumonia  Blood culture negative  - Decadron starting 12/24-1/3  - ABx: Unasyn starting 12/25, azithromycin starting 12/25  - Supportive managements: IS, self prone, judicious fluid  Weaning off oxygen as tolerated          History of nephrolithiasis  Assessment & Plan  History of nephrolithiasis and R ureteral stent, which was removed on 11/13/2019 by Dr. Dobbins  Patient reports flank pain and urinary retention, Cr worsening  Abd CT without contrast: No evidence of hydronephrosis. Tiny nonobstructing right renal calculus     Sepsis (HCC)  Assessment & Plan  This is Sepsis Present on admission  SIRS criteria identified on my evaluation  include: Tachycardia, with heart rate greater than 90 BPM, Tachypnea, with respirations greater than 20 per minute and Leukocytosis, with WBC greater than 12,000  Source is covid pneumonia  Sepsis protocol initiated  Fluid resuscitation ordered per protocol  IV antibiotics as appropriate for source of sepsis  While organ dysfunction may be noted elsewhere in this problem list or in the chart, degree of organ dysfunction does not meet CMS criteria for severe sepsis    Lactic acid trending down 2.6>1.7  Blood culture negative  Procalcitonin: 1.02  Abx: unasyn and azithromycin starting 12/25      Urinary retention  Assessment & Plan  Patient returns urine on 2 occasions  UA: 2-5 wbc  Place Faulkner  on tamsulosin  Abd CT: no hydronephrosis.  Voiding trial on 12/26, successful. Faulkner removed    Bradycardia  Assessment & Plan  HR reported down to 35, asymptomatic  EKG ordered    Hypertension- (present on admission)  Assessment & Plan  As needed labetalol       VTE prophylaxis: lovenox

## 2020-12-28 LAB — EKG IMPRESSION: NORMAL

## 2020-12-28 PROCEDURE — 93010 ELECTROCARDIOGRAM REPORT: CPT | Performed by: STUDENT IN AN ORGANIZED HEALTH CARE EDUCATION/TRAINING PROGRAM

## 2020-12-28 NOTE — PROGRESS NOTES
Patient discharged against medical advice. Patient A&O x4 and educated extensively on risks of leaving against medical advice. Patient understands and accepts risks and continues expressing desire to be discharged AMA. IV removed. Monitor room notified.

## 2020-12-28 NOTE — DISCHARGE SUMMARY
Discharge Summary    CHIEF COMPLAINT ON ADMISSION  Chief Complaint   Patient presents with   • Weakness   • Failure to Thrive       Reason for Admission  EMS     Admission Date  12/24/2020    CODE STATUS  DNAR/DNI    HPI & HOSPITAL COURSE  65 y.o. male was admitted on 12/24 for 2-week history of dyspnea, general weakness, malaise and fatigue. Patient also reporting back pain and suprapubic tenderness. At the emergency department, he was noted to have sinus tachycardia, and tachypnea. Patient requiring 2 L nasal cannula to maintain saturations.  In ED, Covid positive.  Lactic acid initially at 2.6, improved to 1.7 after IVF.  He was placed on [place] due to urinary retention. Chest x-ray concerning multifocal pneumonia, possibly COVID-19 pneumonia.      He was placed on 2-3 L O2. He was start decadron 6mg daily since 12/24. Unasyn and azithromycin were started since 12/25. Blood culture negative. Given his hx of nephrolithiasis and complaints of back pain, abd CT ordered showing No evidence of hydronephrosis. Tiny nonobstructing right renal calculus. Voiding trial was successful and de paz was removed.    He is noticed very lethargic. PT/OT evaluation is pending.     Patient is AOx4. Code status discussed with patient and he would like DNR/DNI. POLST signed.     However patient does not believe he has covid although we gave him all the evidence and kept refusing treatments including decadron and antibiotics. On 12/27, patient wants to leave the hospital AGAINST MEDICAL ADVICE. I examined and discussed extensively with patient at bedside.  Prolonged time spent with patient discussing the risk of leaving the hospital AGAINST MEDICAL ADVICE.  At this moment patient is alert oriented x4 and from my best clinical knowledge and judgment patient does have decision-making capacity. Patient is not a risk to himself or others. I explained to the patient the risk including worsening of medical condition, not able to have enough  medical care, worse case scenario can be even death.  Patient understood and still wants to leave the hospital AGAINST MEDICAL ADVICE. Patient currently does not have risk of imminent death. Per my evaluation and nursing staff evaluation patient does have capacity to make medical decision.     Therefore, he is discharged in guarded and stable condition against medcial advice.    The patient met 2-midnight criteria for an inpatient stay at the time of discharge.    Discharge Date  12/27/2020    FOLLOW UP ITEMS POST DISCHARGE  pcp    DISCHARGE DIAGNOSES  Principal Problem:    Acute respiratory failure with hypoxia (HCC) POA: Unknown  Active Problems:    Pneumonia due to COVID-19 virus POA: Unknown    History of nephrolithiasis POA: Unknown    Bradycardia POA: Unknown    Hypertension POA: Yes  Resolved Problems:    Urinary retention POA: Unknown    Sepsis (HCC) POA: Unknown      FOLLOW UP  No future appointments.  No follow-up provider specified.    MEDICATIONS ON DISCHARGE     Medication List      CONTINUE taking these medications      Instructions   acetaminophen 500 MG Tabs  Commonly known as: TYLENOL   Take 1,000 mg by mouth every 6 hours as needed.  Dose: 1,000 mg     tamsulosin 0.4 MG capsule  Commonly known as: FLOMAX   Take 1 Cap by mouth ONE-HALF HOUR AFTER BREAKFAST.  Dose: 0.4 mg            Allergies  No Known Allergies    DIET  Orders Placed This Encounter   Procedures   • Diet Order Diet: Consistent CHO (Diabetic)     Standing Status:   Standing     Number of Occurrences:   1     Order Specific Question:   Diet:     Answer:   Consistent CHO (Diabetic) [4]       ACTIVITY  As tolerated.  Weight bearing as tolerated    CONSULTATIONS  none    PROCEDURES  none    LABORATORY  Lab Results   Component Value Date    SODIUM 138 12/26/2020    POTASSIUM 4.2 12/26/2020    CHLORIDE 100 12/26/2020    CO2 27 12/26/2020    GLUCOSE 112 (H) 12/26/2020    BUN 42 (H) 12/26/2020    CREATININE 1.04 12/26/2020        Lab Results    Component Value Date    WBC 9.8 12/26/2020    HEMOGLOBIN 14.8 12/26/2020    HEMATOCRIT 44.3 12/26/2020    PLATELETCT 273 12/26/2020        Total time of the discharge process exceeds 40 minutes.

## 2020-12-30 LAB
BACTERIA BLD CULT: NORMAL
SIGNIFICANT IND 70042: NORMAL
SITE SITE: NORMAL
SOURCE SOURCE: NORMAL

## 2021-01-18 NOTE — DOCUMENTATION QUERY
"                                                                         [facility]                                                                       Query Response Note      PATIENT:               NEVA BRYANT  ACCT #:                  [id number]  MRN:                     [id number]  :                      1955  ADMIT DATE:       2020 1:50 PM  DISCH DATE:        2020 4:23 PM  RESPONDING  PROVIDER #:        [id number]           QUERY TEXT:    It is unclear whether  Sepsis   was present on admission.  Your help is needed.  Please clarify the POA status.    Thank you,  [email or web address] [email or web address]  [phone]      The patient's Clinical Indicators include:  Sepsis first documented on , the day after admission.No mention of Sepsis per H&P.    H&P Assessment:  \"Acute Respiratory Failure, Urinary Retention, Pneumonia due to COVID-19 virus, HTN\"    Thank you,  [email or web address] [email or web address]  [phone]  Options provided:   -- Sepsis  was present on admission   -- Sepsis  was not present on admission   -- Unable to determine      Query created by: Mayte Martinez on 2021 1:37 PM    RESPONSE TEXT:    Sepsis was present on admission          Electronically signed by:  MAYITO TRUONG MD 2021 1:49 PM              "

## 2021-03-03 DIAGNOSIS — Z23 NEED FOR VACCINATION: ICD-10-CM

## 2021-11-17 ENCOUNTER — HOSPITAL ENCOUNTER (EMERGENCY)
Facility: MEDICAL CENTER | Age: 66
End: 2021-11-17
Payer: MEDICARE

## 2021-11-17 VITALS
SYSTOLIC BLOOD PRESSURE: 143 MMHG | OXYGEN SATURATION: 95 % | RESPIRATION RATE: 16 BRPM | HEART RATE: 102 BPM | DIASTOLIC BLOOD PRESSURE: 98 MMHG | TEMPERATURE: 97.7 F | WEIGHT: 161.6 LBS | HEIGHT: 71 IN | BODY MASS INDEX: 22.62 KG/M2

## 2021-11-17 LAB — EKG IMPRESSION: NORMAL

## 2021-11-17 PROCEDURE — 302449 STATCHG TRIAGE ONLY (STATISTIC)

## 2021-11-17 PROCEDURE — 93005 ELECTROCARDIOGRAM TRACING: CPT

## 2021-11-17 ASSESSMENT — FIBROSIS 4 INDEX: FIB4 SCORE: 1.71

## 2021-11-18 NOTE — ED TRIAGE NOTES
"Chief Complaint   Patient presents with   • Palpitations     \"fluttering\", not pain   • Anxiety   • Headache     BIB EMS from halfway for chest fluttering, anxiety and now HA after receiving nitro at the halfway. Sts HA has subsided at this time. Denies further CP, SOB. Neuro intact, equal strength bilat.    /98   Pulse (!) 102   Temp 36.5 °C (97.7 °F) (Temporal)   Resp 16   Ht 1.803 m (5' 11\")   Wt 73.3 kg (161 lb 9.6 oz)   SpO2 95%   BMI 22.54 kg/m²     "

## 2022-11-10 ENCOUNTER — PATIENT MESSAGE (OUTPATIENT)
Dept: HEALTH INFORMATION MANAGEMENT | Facility: OTHER | Age: 67
End: 2022-11-10

## (undated) DEVICE — COVER FOOT UNIVERSAL DISP. - (25EA/CA)

## (undated) DEVICE — GOWN SURGEONS X-LARGE - DISP. (30/CA)

## (undated) DEVICE — BASKET ZERO TIP

## (undated) DEVICE — NEPTUNE 4 PORT MANIFOLD - (20/PK)

## (undated) DEVICE — KIT ANESTHESIA W/CIRCUIT & 3/LT BAG W/FILTER (20EA/CA)

## (undated) DEVICE — MASK, LARYNGEAL AIRWAY #4

## (undated) DEVICE — GOWN WARMING STANDARD FLEX - (30/CA)

## (undated) DEVICE — CATHETER URET RUTNR WEDGE TIP 5FR (10EA/BX)

## (undated) DEVICE — SODIUM CHL. IRRIGATION 0.9% 3000ML (4EA/CA 65CA/PF)

## (undated) DEVICE — GOWN SURGICAL X-LARGE ULTRA - FILM-REINFORCED (20/CA)

## (undated) DEVICE — ELECTRODE 850 FOAM ADHESIVE - HYDROGEL RADIOTRNSPRNT (50/PK)

## (undated) DEVICE — SENSOR SPO2 NEO LNCS ADHESIVE (20/BX) SEE USER NOTES

## (undated) DEVICE — PROTECTOR ULNA NERVE - (36PR/CA)

## (undated) DEVICE — PACK CYSTOSCOPY - (14/CA)

## (undated) DEVICE — SET EXTENSION WITH 2 PORTS (48EA/CA) ***PART #2C8610 IS A SUBSTITUTE*****

## (undated) DEVICE — WATER IRRIG. STER 3000 ML - (4/CA)

## (undated) DEVICE — PACK CYSTOSCOPY III - (8/CA)

## (undated) DEVICE — CONNECTOR HOSE NEPTUNE FOR CYSTO ROOM

## (undated) DEVICE — CONTAINER SPECIMEN BAG OR - STERILE 4 OZ W/LID (100EA/CA)

## (undated) DEVICE — GLOVE BIOGEL SZ 7.5 SURGICAL PF LTX - (50PR/BX 4BX/CA)

## (undated) DEVICE — HEAD HOLDER JUNIOR/ADULT

## (undated) DEVICE — TUBE CONNECT SUCTION CLEAR 120 X 1/4" (50EA/CA)"

## (undated) DEVICE — GLOVE BIOGEL PI INDICATOR SZ 7.5 SURGICAL PF LF -(50/BX 4BX/CA)

## (undated) DEVICE — SPONGE GAUZESTER 4 X 4 4PLY - (128PK/CA)

## (undated) DEVICE — SET LEADWIRE 5 LEAD BEDSIDE DISPOSABLE ECG (1SET OF 5/EA)

## (undated) DEVICE — TUBING CLEARLINK DUO-VENT - C-FLO (48EA/CA)

## (undated) DEVICE — WATER IRRIG. STER. 1500 ML - (9/CA)

## (undated) DEVICE — KIT ROOM DECONTAMINATION

## (undated) DEVICE — CATHETER URET OPEN END 6FR (10EA/BX)

## (undated) DEVICE — MASK ANESTHESIA ADULT  - (100/CA)

## (undated) DEVICE — GLOVE BIOGEL SZ 8 SURGICAL PF LTX - (50PR/BX 4BX/CA)

## (undated) DEVICE — JELLY, KY 2 0Z STERILE

## (undated) DEVICE — SYRINGE DISP. 12 CC LL - (100/BX)

## (undated) DEVICE — Device

## (undated) DEVICE — LACTATED RINGERS INJ 1000 ML - (14EA/CA 60CA/PF)

## (undated) DEVICE — EVACUATOR BLADDER ELLIK - (10/BX)

## (undated) DEVICE — TOWELS CLOTH SURGICAL - (4/PK 20PK/CA)

## (undated) DEVICE — SET IRRIGATION CYSTOSCOPY Y-TYPE L81 IN (20EA/CA)

## (undated) DEVICE — SUCTION INSTRUMENT YANKAUER BULBOUS TIP W/O VENT (50EA/CA)

## (undated) DEVICE — DETERGENT RENUZYME PLUS 10 OZ PACKET (50/BX)

## (undated) DEVICE — ELECTRODE DUAL RETURN W/ CORD - (50/PK)

## (undated) DEVICE — SLEEVE, VASO, THIGH, MED

## (undated) DEVICE — CATHETER FOLEY SILICONE 20FR 5CC - (12/BX)2-WAY UFD5964 OR 2D2622

## (undated) DEVICE — WIRE GUIDE SENSOR DUAL FLEX - 5/BX

## (undated) DEVICE — JELLY SURGILUBE STERILE TUBE 4.25 OZ (1/EA)

## (undated) DEVICE — BAG URODRAIN WITH TUBING - (20/CA)

## (undated) DEVICE — SCOPE DIGITAL URETEROSCOPE DISPOSABLE